# Patient Record
Sex: FEMALE | Race: WHITE | NOT HISPANIC OR LATINO
[De-identification: names, ages, dates, MRNs, and addresses within clinical notes are randomized per-mention and may not be internally consistent; named-entity substitution may affect disease eponyms.]

---

## 2017-09-28 PROBLEM — Z00.00 ENCOUNTER FOR PREVENTIVE HEALTH EXAMINATION: Status: ACTIVE | Noted: 2017-09-28

## 2017-10-05 ENCOUNTER — APPOINTMENT (OUTPATIENT)
Dept: THORACIC SURGERY | Facility: CLINIC | Age: 62
End: 2017-10-05
Payer: COMMERCIAL

## 2017-10-05 VITALS
SYSTOLIC BLOOD PRESSURE: 117 MMHG | RESPIRATION RATE: 16 BRPM | WEIGHT: 136 LBS | BODY MASS INDEX: 21.6 KG/M2 | HEIGHT: 66.5 IN | OXYGEN SATURATION: 97 % | DIASTOLIC BLOOD PRESSURE: 75 MMHG | HEART RATE: 94 BPM

## 2017-10-05 DIAGNOSIS — Z87.898 PERSONAL HISTORY OF OTHER SPECIFIED CONDITIONS: ICD-10-CM

## 2017-10-05 DIAGNOSIS — J45.909 UNSPECIFIED ASTHMA, UNCOMPLICATED: ICD-10-CM

## 2017-10-05 DIAGNOSIS — Z86.79 PERSONAL HISTORY OF OTHER DISEASES OF THE CIRCULATORY SYSTEM: ICD-10-CM

## 2017-10-05 DIAGNOSIS — Z85.828 PERSONAL HISTORY OF OTHER MALIGNANT NEOPLASM OF SKIN: ICD-10-CM

## 2017-10-05 DIAGNOSIS — F17.200 NICOTINE DEPENDENCE, UNSPECIFIED, UNCOMPLICATED: ICD-10-CM

## 2017-10-05 DIAGNOSIS — R91.1 SOLITARY PULMONARY NODULE: ICD-10-CM

## 2017-10-05 PROCEDURE — 99203 OFFICE O/P NEW LOW 30 MIN: CPT

## 2017-10-05 RX ORDER — LEVOTHYROXINE SODIUM 112 UG/1
112 TABLET ORAL
Refills: 0 | Status: ACTIVE | COMMUNITY

## 2017-10-05 RX ORDER — CYCLOBENZAPRINE HCL 10 MG
10 TABLET ORAL
Refills: 0 | Status: ACTIVE | COMMUNITY

## 2017-10-05 RX ORDER — GABAPENTIN 300 MG/1
300 CAPSULE ORAL
Refills: 0 | Status: ACTIVE | COMMUNITY

## 2017-10-05 RX ORDER — PROPRANOLOL HCL 120 MG
120 CAPSULE, EXTENDED RELEASE 24HR ORAL
Refills: 0 | Status: ACTIVE | COMMUNITY

## 2017-10-05 RX ORDER — BUTALBITAL, ACETAMINOPHEN, AND CAFFEINE 50; 300; 40 MG/1; MG/1; MG/1
CAPSULE ORAL
Refills: 0 | Status: ACTIVE | COMMUNITY

## 2017-10-05 RX ORDER — LIOTHYRONINE SODIUM 50 UG/1
TABLET ORAL
Refills: 0 | Status: ACTIVE | COMMUNITY

## 2017-10-07 ENCOUNTER — TRANSCRIPTION ENCOUNTER (OUTPATIENT)
Age: 62
End: 2017-10-07

## 2017-10-18 ENCOUNTER — OUTPATIENT (OUTPATIENT)
Dept: OUTPATIENT SERVICES | Facility: HOSPITAL | Age: 62
LOS: 1 days | Discharge: HOME | End: 2017-10-18
Payer: MEDICAID

## 2017-10-18 VITALS
TEMPERATURE: 98 F | DIASTOLIC BLOOD PRESSURE: 60 MMHG | SYSTOLIC BLOOD PRESSURE: 122 MMHG | RESPIRATION RATE: 15 BRPM | OXYGEN SATURATION: 100 % | HEART RATE: 68 BPM | WEIGHT: 134.92 LBS | HEIGHT: 66.5 IN

## 2017-10-18 DIAGNOSIS — E03.9 HYPOTHYROIDISM, UNSPECIFIED: ICD-10-CM

## 2017-10-18 DIAGNOSIS — Z98.890 OTHER SPECIFIED POSTPROCEDURAL STATES: Chronic | ICD-10-CM

## 2017-10-18 DIAGNOSIS — Z90.89 ACQUIRED ABSENCE OF OTHER ORGANS: Chronic | ICD-10-CM

## 2017-10-18 DIAGNOSIS — J43.9 EMPHYSEMA, UNSPECIFIED: ICD-10-CM

## 2017-10-18 DIAGNOSIS — M79.7 FIBROMYALGIA: ICD-10-CM

## 2017-10-18 DIAGNOSIS — Z85.828 PERSONAL HISTORY OF OTHER MALIGNANT NEOPLASM OF SKIN: Chronic | ICD-10-CM

## 2017-10-18 DIAGNOSIS — R91.1 SOLITARY PULMONARY NODULE: ICD-10-CM

## 2017-10-18 DIAGNOSIS — F17.200 NICOTINE DEPENDENCE, UNSPECIFIED, UNCOMPLICATED: ICD-10-CM

## 2017-10-18 DIAGNOSIS — I34.1 NONRHEUMATIC MITRAL (VALVE) PROLAPSE: ICD-10-CM

## 2017-10-18 DIAGNOSIS — Z01.818 ENCOUNTER FOR OTHER PREPROCEDURAL EXAMINATION: ICD-10-CM

## 2017-10-18 DIAGNOSIS — I10 ESSENTIAL (PRIMARY) HYPERTENSION: ICD-10-CM

## 2017-10-18 LAB
ANION GAP SERPL CALC-SCNC: 5 MMOL/L — SIGNIFICANT CHANGE UP (ref 5–17)
BASOPHILS # BLD AUTO: 0.1 K/UL — SIGNIFICANT CHANGE UP (ref 0–0.2)
BASOPHILS NFR BLD AUTO: 1.2 % — SIGNIFICANT CHANGE UP (ref 0–2)
BUN SERPL-MCNC: 11 MG/DL — SIGNIFICANT CHANGE UP (ref 7–23)
CALCIUM SERPL-MCNC: 9.2 MG/DL — SIGNIFICANT CHANGE UP (ref 8.5–10.1)
CHLORIDE SERPL-SCNC: 101 MMOL/L — SIGNIFICANT CHANGE UP (ref 96–108)
CO2 SERPL-SCNC: 29 MMOL/L — SIGNIFICANT CHANGE UP (ref 22–31)
CREAT SERPL-MCNC: 0.85 MG/DL — SIGNIFICANT CHANGE UP (ref 0.5–1.3)
EOSINOPHIL # BLD AUTO: 0.2 K/UL — SIGNIFICANT CHANGE UP (ref 0–0.5)
EOSINOPHIL NFR BLD AUTO: 3.3 % — SIGNIFICANT CHANGE UP (ref 0–6)
GLUCOSE SERPL-MCNC: 92 MG/DL — SIGNIFICANT CHANGE UP (ref 70–99)
HCT VFR BLD CALC: 40.8 % — SIGNIFICANT CHANGE UP (ref 34.5–45)
HGB BLD-MCNC: 14.3 G/DL — SIGNIFICANT CHANGE UP (ref 11.5–15.5)
LYMPHOCYTES # BLD AUTO: 2.1 K/UL — SIGNIFICANT CHANGE UP (ref 1–3.3)
LYMPHOCYTES # BLD AUTO: 31.5 % — SIGNIFICANT CHANGE UP (ref 13–44)
MCHC RBC-ENTMCNC: 33 PG — SIGNIFICANT CHANGE UP (ref 27–34)
MCHC RBC-ENTMCNC: 35 GM/DL — SIGNIFICANT CHANGE UP (ref 32–36)
MCV RBC AUTO: 94.4 FL — SIGNIFICANT CHANGE UP (ref 80–100)
MONOCYTES # BLD AUTO: 0.4 K/UL — SIGNIFICANT CHANGE UP (ref 0–0.9)
MONOCYTES NFR BLD AUTO: 6.3 % — SIGNIFICANT CHANGE UP (ref 2–14)
NEUTROPHILS # BLD AUTO: 3.9 K/UL — SIGNIFICANT CHANGE UP (ref 1.8–7.4)
NEUTROPHILS NFR BLD AUTO: 57.7 % — SIGNIFICANT CHANGE UP (ref 43–77)
PLATELET # BLD AUTO: 296 K/UL — SIGNIFICANT CHANGE UP (ref 150–400)
POTASSIUM SERPL-MCNC: 4.3 MMOL/L — SIGNIFICANT CHANGE UP (ref 3.5–5.3)
POTASSIUM SERPL-SCNC: 4.3 MMOL/L — SIGNIFICANT CHANGE UP (ref 3.5–5.3)
RBC # BLD: 4.33 M/UL — SIGNIFICANT CHANGE UP (ref 3.8–5.2)
RBC # FLD: 12.4 % — SIGNIFICANT CHANGE UP (ref 10.3–14.5)
SODIUM SERPL-SCNC: 135 MMOL/L — SIGNIFICANT CHANGE UP (ref 135–145)
WBC # BLD: 6.7 K/UL — SIGNIFICANT CHANGE UP (ref 3.8–10.5)
WBC # FLD AUTO: 6.7 K/UL — SIGNIFICANT CHANGE UP (ref 3.8–10.5)

## 2017-10-18 PROCEDURE — 93010 ELECTROCARDIOGRAM REPORT: CPT

## 2017-10-18 NOTE — H&P PST ADULT - FAMILY HISTORY
Father  Still living? No  Family history of cirrhosis of liver, Age at diagnosis: Age Unknown     Mother  Still living? No  Family history of COPD (chronic obstructive pulmonary disease), Age at diagnosis: Age Unknown

## 2017-10-18 NOTE — H&P PST ADULT - HEALTH CARE MAINTENANCE
rec'd flu vaccine @ [facility] Oct 2017  given flu vaccine info sheet dated 8/7/15.

## 2017-10-18 NOTE — H&P PST ADULT - ENDOCRINE COMMENTS
Reports being treated with radioactive iodine for overactive thyroid. Now requires treatment for hypothyroidism. Followed by endocrinologist.

## 2017-10-18 NOTE — H&P PST ADULT - ASSESSMENT
63 yo female scheduled for  flexible bronchoscopy & right lung resection with Dr. Lopez on 10/25/17    1. Labs as per surgeon  2. EKG  3. Medical clearance with PCP Jake & cardiologist Dr Rodriguez  4. discussed EZ sponges & day of procedure instructions  5. Discussed smoking cessation.

## 2017-10-18 NOTE — H&P PST ADULT - PSH
H/O left breast biopsy  2000. 2001  H/O Moh's micrographic surgery for skin cancer  right thigh 2015  S/P colonoscopy    S/P endoscopy    S/P tonsillectomy  1965

## 2017-10-18 NOTE — H&P PST ADULT - PMH
Back pain    Cervical disc disease    Cigarette smoker    Emphysema of lung    Lumbar disc herniation    Lung mass  right side  Migraine    Neck pain    Neck swelling  b/l swelling superior to clavicles  Palpitation    Skin cancer  right thigh  Thyroid disease Back pain    Cervical disc disease    Cigarette smoker    Emphysema of lung    Hearing loss    Lumbar disc herniation    Lung mass  right side  Migraine    Neck pain    Neck swelling  b/l swelling superior to clavicles  Palpitation    Skin cancer  right thigh  Thyroid disease    Tinnitus    Trigeminal neuralgia

## 2017-10-18 NOTE — H&P PST ADULT - HISTORY OF PRESENT ILLNESS
63 yo female presents to PST prior to proposed procedure. Reports being sent for chest CT in september due to "swelling above collar bones". Reports seeing mass in right lung & was sent for PET scan. States "it lit up". Denies chest pain, cough or SOB.   Now for said procedure.

## 2017-10-18 NOTE — H&P PST ADULT - RESPIRATORY AND THORAX COMMENTS
Reports emphysema. Followed by pulmonologist. Denies use of inhalers. reports recent diagnosis of right lung mass

## 2017-10-24 RX ORDER — FENTANYL CITRATE 50 UG/ML
50 INJECTION INTRAVENOUS
Qty: 0 | Refills: 0 | Status: DISCONTINUED | OUTPATIENT
Start: 2017-10-25 | End: 2017-10-25

## 2017-10-24 RX ORDER — SODIUM CHLORIDE 9 MG/ML
1000 INJECTION INTRAMUSCULAR; INTRAVENOUS; SUBCUTANEOUS
Qty: 0 | Refills: 0 | Status: DISCONTINUED | OUTPATIENT
Start: 2017-10-25 | End: 2017-10-25

## 2017-10-25 ENCOUNTER — RESULT REVIEW (OUTPATIENT)
Age: 62
End: 2017-10-25

## 2017-10-25 ENCOUNTER — INPATIENT (INPATIENT)
Facility: HOSPITAL | Age: 62
LOS: 7 days | Discharge: HOME | End: 2017-11-02
Attending: THORACIC SURGERY (CARDIOTHORACIC VASCULAR SURGERY) | Admitting: THORACIC SURGERY (CARDIOTHORACIC VASCULAR SURGERY)
Payer: COMMERCIAL

## 2017-10-25 ENCOUNTER — APPOINTMENT (OUTPATIENT)
Dept: THORACIC SURGERY | Facility: HOSPITAL | Age: 62
End: 2017-10-25
Payer: COMMERCIAL

## 2017-10-25 VITALS
WEIGHT: 134.92 LBS | RESPIRATION RATE: 16 BRPM | HEIGHT: 66.5 IN | TEMPERATURE: 99 F | OXYGEN SATURATION: 99 % | SYSTOLIC BLOOD PRESSURE: 114 MMHG | HEART RATE: 72 BPM | DIASTOLIC BLOOD PRESSURE: 62 MMHG

## 2017-10-25 DIAGNOSIS — Z90.89 ACQUIRED ABSENCE OF OTHER ORGANS: Chronic | ICD-10-CM

## 2017-10-25 DIAGNOSIS — Z98.890 OTHER SPECIFIED POSTPROCEDURAL STATES: Chronic | ICD-10-CM

## 2017-10-25 DIAGNOSIS — Z85.828 PERSONAL HISTORY OF OTHER MALIGNANT NEOPLASM OF SKIN: Chronic | ICD-10-CM

## 2017-10-25 LAB
ABO RH CONFIRMATION: SIGNIFICANT CHANGE UP
ALLERGY+IMMUNOLOGY DIAG STUDY NOTE: SIGNIFICANT CHANGE UP
GRAM STN FLD: SIGNIFICANT CHANGE UP
SPECIMEN SOURCE: SIGNIFICANT CHANGE UP

## 2017-10-25 PROCEDURE — 32666 THORACOSCOPY W/WEDGE RESECT: CPT

## 2017-10-25 PROCEDURE — 88307 TISSUE EXAM BY PATHOLOGIST: CPT | Mod: 26

## 2017-10-25 PROCEDURE — 88331 PATH CONSLTJ SURG 1 BLK 1SPC: CPT | Mod: 26

## 2017-10-25 PROCEDURE — 32674 THORACOSCOPY LYMPH NODE EXC: CPT

## 2017-10-25 PROCEDURE — 31622 DX BRONCHOSCOPE/WASH: CPT

## 2017-10-25 PROCEDURE — S2900 ROBOTIC SURGICAL SYSTEM: CPT | Mod: NC

## 2017-10-25 PROCEDURE — 71010: CPT | Mod: 26

## 2017-10-25 PROCEDURE — 88305 TISSUE EXAM BY PATHOLOGIST: CPT | Mod: 26

## 2017-10-25 PROCEDURE — 32667 THORACOSCOPY W/W RESECT ADDL: CPT

## 2017-10-25 RX ORDER — LIOTHYRONINE SODIUM 25 UG/1
10 TABLET ORAL
Qty: 0 | Refills: 0 | COMMUNITY

## 2017-10-25 RX ORDER — IBUPROFEN 200 MG
2 TABLET ORAL
Qty: 0 | Refills: 0 | COMMUNITY

## 2017-10-25 RX ORDER — PROPRANOLOL HCL 160 MG
1 CAPSULE, EXTENDED RELEASE 24HR ORAL
Qty: 0 | Refills: 0 | COMMUNITY

## 2017-10-25 RX ORDER — LIOTHYRONINE SODIUM 25 UG/1
5 TABLET ORAL
Qty: 0 | Refills: 0 | Status: DISCONTINUED | OUTPATIENT
Start: 2017-10-25 | End: 2017-11-02

## 2017-10-25 RX ORDER — LEVOTHYROXINE SODIUM 125 MCG
112 TABLET ORAL DAILY
Qty: 0 | Refills: 0 | Status: DISCONTINUED | OUTPATIENT
Start: 2017-10-25 | End: 2017-11-02

## 2017-10-25 RX ORDER — GABAPENTIN 400 MG/1
300 CAPSULE ORAL THREE TIMES A DAY
Qty: 0 | Refills: 0 | Status: DISCONTINUED | OUTPATIENT
Start: 2017-10-25 | End: 2017-11-02

## 2017-10-25 RX ORDER — IPRATROPIUM/ALBUTEROL SULFATE 18-103MCG
3 AEROSOL WITH ADAPTER (GRAM) INHALATION EVERY 6 HOURS
Qty: 0 | Refills: 0 | Status: DISCONTINUED | OUTPATIENT
Start: 2017-10-25 | End: 2017-11-02

## 2017-10-25 RX ORDER — ONDANSETRON 8 MG/1
4 TABLET, FILM COATED ORAL ONCE
Qty: 0 | Refills: 0 | Status: DISCONTINUED | OUTPATIENT
Start: 2017-10-25 | End: 2017-10-25

## 2017-10-25 RX ORDER — LEVOTHYROXINE SODIUM 125 MCG
1 TABLET ORAL
Qty: 0 | Refills: 0 | COMMUNITY

## 2017-10-25 RX ORDER — NALOXONE HYDROCHLORIDE 4 MG/.1ML
0.1 SPRAY NASAL
Qty: 0 | Refills: 0 | Status: DISCONTINUED | OUTPATIENT
Start: 2017-10-25 | End: 2017-11-02

## 2017-10-25 RX ORDER — HYDROMORPHONE HYDROCHLORIDE 2 MG/ML
0.5 INJECTION INTRAMUSCULAR; INTRAVENOUS; SUBCUTANEOUS
Qty: 0 | Refills: 0 | Status: DISCONTINUED | OUTPATIENT
Start: 2017-10-25 | End: 2017-10-26

## 2017-10-25 RX ORDER — PROPRANOLOL HCL 160 MG
120 CAPSULE, EXTENDED RELEASE 24HR ORAL DAILY
Qty: 0 | Refills: 0 | Status: DISCONTINUED | OUTPATIENT
Start: 2017-10-25 | End: 2017-10-25

## 2017-10-25 RX ORDER — GABAPENTIN 400 MG/1
1 CAPSULE ORAL
Qty: 0 | Refills: 0 | COMMUNITY

## 2017-10-25 RX ORDER — PROPRANOLOL HCL 160 MG
120 CAPSULE, EXTENDED RELEASE 24HR ORAL DAILY
Qty: 0 | Refills: 0 | Status: DISCONTINUED | OUTPATIENT
Start: 2017-10-25 | End: 2017-11-02

## 2017-10-25 RX ORDER — MULTIVIT-MIN/FERROUS GLUCONATE 9 MG/15 ML
1 LIQUID (ML) ORAL
Qty: 0 | Refills: 0 | COMMUNITY

## 2017-10-25 RX ORDER — HYDROMORPHONE HYDROCHLORIDE 2 MG/ML
30 INJECTION INTRAMUSCULAR; INTRAVENOUS; SUBCUTANEOUS
Qty: 0 | Refills: 0 | Status: DISCONTINUED | OUTPATIENT
Start: 2017-10-25 | End: 2017-10-26

## 2017-10-25 RX ORDER — ONDANSETRON 8 MG/1
4 TABLET, FILM COATED ORAL EVERY 6 HOURS
Qty: 0 | Refills: 0 | Status: DISCONTINUED | OUTPATIENT
Start: 2017-10-25 | End: 2017-11-02

## 2017-10-25 RX ORDER — SODIUM CHLORIDE 9 MG/ML
1000 INJECTION INTRAMUSCULAR; INTRAVENOUS; SUBCUTANEOUS
Qty: 0 | Refills: 0 | Status: DISCONTINUED | OUTPATIENT
Start: 2017-10-25 | End: 2017-10-26

## 2017-10-25 RX ORDER — CYCLOBENZAPRINE HYDROCHLORIDE 10 MG/1
1 TABLET, FILM COATED ORAL
Qty: 0 | Refills: 0 | COMMUNITY

## 2017-10-25 RX ORDER — OXYCODONE HYDROCHLORIDE 5 MG/1
10 TABLET ORAL EVERY 6 HOURS
Qty: 0 | Refills: 0 | Status: DISCONTINUED | OUTPATIENT
Start: 2017-10-25 | End: 2017-10-25

## 2017-10-25 RX ADMIN — ONDANSETRON 4 MILLIGRAM(S): 8 TABLET, FILM COATED ORAL at 14:37

## 2017-10-25 RX ADMIN — Medication 3 MILLILITER(S): at 20:27

## 2017-10-25 RX ADMIN — SODIUM CHLORIDE 75 MILLILITER(S): 9 INJECTION INTRAMUSCULAR; INTRAVENOUS; SUBCUTANEOUS at 14:07

## 2017-10-25 RX ADMIN — ONDANSETRON 4 MILLIGRAM(S): 8 TABLET, FILM COATED ORAL at 22:50

## 2017-10-25 RX ADMIN — SODIUM CHLORIDE 50 MILLILITER(S): 9 INJECTION INTRAMUSCULAR; INTRAVENOUS; SUBCUTANEOUS at 15:27

## 2017-10-25 RX ADMIN — HYDROMORPHONE HYDROCHLORIDE 30 MILLILITER(S): 2 INJECTION INTRAMUSCULAR; INTRAVENOUS; SUBCUTANEOUS at 14:03

## 2017-10-25 RX ADMIN — FENTANYL CITRATE 50 MICROGRAM(S): 50 INJECTION INTRAVENOUS at 14:03

## 2017-10-25 NOTE — CONSULT NOTE ADULT - SUBJECTIVE AND OBJECTIVE BOX
Patient is a 62y old  Female who presents with a chief complaint of " I had my surgery"      HPI: Pt is a 61 y/o female who is a current smoker, COPD, hypothyroidism, HTN, fibromyalgia, lumbar disc disease who was noted to have a 3cm Rt lung nodule.  PET scan was positive, she was optimized and admitted for elective robotic Rt lung wedge resection.  Post-op medicine consult called for comanagement.  Pt seen and examined, case d/w .  Pt is still sedated from anesthesia, c/o Rt chest wall pain.        PAST MEDICAL & SURGICAL HISTORY:  Mitral valve prolapse  Tinnitus  Hearing loss: right ear with tinnitus  Trigeminal neuralgia  Neck swelling: b/l swelling superior to clavicles  Lumbar disc herniation  Emphysema of lung  Cervical disc disease  Back pain  Neck pain  Skin cancer: right thigh  Cigarette smoker  Lung mass: right side  Palpitation  Thyroid disease  Migraine  S/P endoscopy  S/P colonoscopy  S/P tonsillectomy: 1965  H/O left breast biopsy: 2000. 2001  H/O Moh's micrographic surgery for skin cancer: right thigh 2015      Allergies    morphine (Other)  Topamax (Other)    Intolerances        MEDICATIONS  (STANDING):  HYDROmorphone PCA (1 mG/mL) 30 milliLiter(s) PCA Continuous PCA Continuous  sodium chloride 0.9%. 1000 milliLiter(s) (50 mL/Hr) IV Continuous <Continuous>    MEDICATIONS  (PRN):  HYDROmorphone PCA (1 mG/mL) Rescue Clinician Bolus 0.5 milliGRAM(s) IV Push every 15 minutes PRN for Pain Scale GREATER THAN 6  naloxone Injectable 0.1 milliGRAM(s) IV Push every 3 minutes PRN For ANY of the following changes in patient status:  A. RR LESS THAN 10 breaths per minute, B. Oxygen saturation LESS THAN 90%, C. Sedation score of 6  ondansetron Injectable 4 milliGRAM(s) IV Push every 6 hours PRN Nausea      FAMILY HISTORY:  Family history of COPD (chronic obstructive pulmonary disease) (Mother)  Family history of cirrhosis of liver (Father)      Social History  , lives with , current smoker (smokes 1 ppd for 45 years), no h/o substance abuse    Vital Signs Last 24 Hrs  T(C): 36.2 (25 Oct 2017 17:43), Max: 37.1 (25 Oct 2017 08:44)  T(F): 97.2 (25 Oct 2017 17:43), Max: 98.7 (25 Oct 2017 08:44)  HR: 82 (25 Oct 2017 18:15) (50 - 82)  BP: 115/58 (25 Oct 2017 18:15) (106/53 - 141/75)  BP(mean): 72 (25 Oct 2017 18:15) (65 - 72)  RR: 15 (25 Oct 2017 18:15) (11 - 16)  SpO2: 98% (25 Oct 2017 18:15) (97% - 100%)    Daily Height in cm: 168.91 (25 Oct 2017 08:44)    Daily     I&O's Summary    25 Oct 2017 07:01  -  25 Oct 2017 18:56  --------------------------------------------------------  IN: 1250 mL / OUT: 760 mL / NET: 490 mL          Data

## 2017-10-25 NOTE — PATIENT PROFILE ADULT. - PMH
Back pain    Cervical disc disease    Cigarette smoker    Emphysema of lung    Hearing loss  right ear with tinnitus  Lumbar disc herniation    Lung mass  right side  Migraine    Neck pain    Neck swelling  b/l swelling superior to clavicles  Palpitation    Skin cancer  right thigh  Thyroid disease    Tinnitus    Trigeminal neuralgia Back pain    Cervical disc disease    Cigarette smoker    Emphysema of lung    Hearing loss  right ear with tinnitus  Lumbar disc herniation    Lung mass  right side  Migraine    Mitral valve prolapse    Neck pain    Neck swelling  b/l swelling superior to clavicles  Palpitation    Skin cancer  right thigh  Thyroid disease    Tinnitus    Trigeminal neuralgia

## 2017-10-25 NOTE — BRIEF OPERATIVE NOTE - PROCEDURE
<<-----Click on this checkbox to enter Procedure Wedge resection of right lung  10/25/2017  Robotically assisted right middle and right lower lobe wedge resection  Active  [email or web address]

## 2017-10-25 NOTE — CONSULT NOTE ADULT - CONSULT REASON
CTSP by Dr Lopez for medical comanagement.      Wedge resection of right lung  10/25/2017  Robotically assisted right middle and right lower lobe wedge resection  Active  [email or web address].

## 2017-10-26 DIAGNOSIS — R91.8 OTHER NONSPECIFIC ABNORMAL FINDING OF LUNG FIELD: ICD-10-CM

## 2017-10-26 LAB
ANION GAP SERPL CALC-SCNC: 6 MMOL/L — SIGNIFICANT CHANGE UP (ref 5–17)
BASOPHILS # BLD AUTO: 0 K/UL — SIGNIFICANT CHANGE UP (ref 0–0.2)
BASOPHILS NFR BLD AUTO: 0.1 % — SIGNIFICANT CHANGE UP (ref 0–2)
BUN SERPL-MCNC: 10 MG/DL — SIGNIFICANT CHANGE UP (ref 7–23)
CALCIUM SERPL-MCNC: 8.8 MG/DL — SIGNIFICANT CHANGE UP (ref 8.5–10.1)
CHLORIDE SERPL-SCNC: 104 MMOL/L — SIGNIFICANT CHANGE UP (ref 96–108)
CO2 SERPL-SCNC: 28 MMOL/L — SIGNIFICANT CHANGE UP (ref 22–31)
CREAT SERPL-MCNC: 0.67 MG/DL — SIGNIFICANT CHANGE UP (ref 0.5–1.3)
EOSINOPHIL # BLD AUTO: 0 K/UL — SIGNIFICANT CHANGE UP (ref 0–0.5)
EOSINOPHIL NFR BLD AUTO: 0 % — SIGNIFICANT CHANGE UP (ref 0–6)
GLUCOSE SERPL-MCNC: 132 MG/DL — HIGH (ref 70–99)
HCT VFR BLD CALC: 36.8 % — SIGNIFICANT CHANGE UP (ref 34.5–45)
HGB BLD-MCNC: 13 G/DL — SIGNIFICANT CHANGE UP (ref 11.5–15.5)
LYMPHOCYTES # BLD AUTO: 0.8 K/UL — LOW (ref 1–3.3)
LYMPHOCYTES # BLD AUTO: 5.7 % — LOW (ref 13–44)
MCHC RBC-ENTMCNC: 33.3 PG — SIGNIFICANT CHANGE UP (ref 27–34)
MCHC RBC-ENTMCNC: 35.3 GM/DL — SIGNIFICANT CHANGE UP (ref 32–36)
MCV RBC AUTO: 94.2 FL — SIGNIFICANT CHANGE UP (ref 80–100)
MONOCYTES # BLD AUTO: 0.9 K/UL — SIGNIFICANT CHANGE UP (ref 0–0.9)
MONOCYTES NFR BLD AUTO: 5.8 % — SIGNIFICANT CHANGE UP (ref 2–14)
NEUTROPHILS # BLD AUTO: 13.3 K/UL — HIGH (ref 1.8–7.4)
NEUTROPHILS NFR BLD AUTO: 88.4 % — HIGH (ref 43–77)
NIGHT BLUE STAIN TISS: SIGNIFICANT CHANGE UP
PLATELET # BLD AUTO: 255 K/UL — SIGNIFICANT CHANGE UP (ref 150–400)
POTASSIUM SERPL-MCNC: 4.8 MMOL/L — SIGNIFICANT CHANGE UP (ref 3.5–5.3)
POTASSIUM SERPL-SCNC: 4.8 MMOL/L — SIGNIFICANT CHANGE UP (ref 3.5–5.3)
RBC # BLD: 3.91 M/UL — SIGNIFICANT CHANGE UP (ref 3.8–5.2)
RBC # FLD: 12.5 % — SIGNIFICANT CHANGE UP (ref 10.3–14.5)
SODIUM SERPL-SCNC: 138 MMOL/L — SIGNIFICANT CHANGE UP (ref 135–145)
SPECIMEN SOURCE: SIGNIFICANT CHANGE UP
WBC # BLD: 15 K/UL — HIGH (ref 3.8–10.5)
WBC # FLD AUTO: 15 K/UL — HIGH (ref 3.8–10.5)

## 2017-10-26 PROCEDURE — 71010: CPT | Mod: 26,77

## 2017-10-26 PROCEDURE — 71010: CPT | Mod: 26

## 2017-10-26 RX ORDER — OXYCODONE HYDROCHLORIDE 5 MG/1
10 TABLET ORAL EVERY 6 HOURS
Qty: 0 | Refills: 0 | Status: DISCONTINUED | OUTPATIENT
Start: 2017-10-26 | End: 2017-11-01

## 2017-10-26 RX ORDER — OXYCODONE HYDROCHLORIDE 5 MG/1
5 TABLET ORAL EVERY 4 HOURS
Qty: 0 | Refills: 0 | Status: DISCONTINUED | OUTPATIENT
Start: 2017-10-26 | End: 2017-11-02

## 2017-10-26 RX ORDER — HEPARIN SODIUM 5000 [USP'U]/ML
5000 INJECTION INTRAVENOUS; SUBCUTANEOUS EVERY 8 HOURS
Qty: 0 | Refills: 0 | Status: DISCONTINUED | OUTPATIENT
Start: 2017-10-26 | End: 2017-11-02

## 2017-10-26 RX ORDER — IBUPROFEN 200 MG
400 TABLET ORAL EVERY 6 HOURS
Qty: 0 | Refills: 0 | Status: DISCONTINUED | OUTPATIENT
Start: 2017-10-26 | End: 2017-10-28

## 2017-10-26 RX ORDER — PROCHLORPERAZINE MALEATE 5 MG
10 TABLET ORAL EVERY 6 HOURS
Qty: 0 | Refills: 0 | Status: DISCONTINUED | OUTPATIENT
Start: 2017-10-26 | End: 2017-11-02

## 2017-10-26 RX ORDER — SUMATRIPTAN SUCCINATE 4 MG/.5ML
25 INJECTION, SOLUTION SUBCUTANEOUS DAILY
Qty: 0 | Refills: 0 | Status: DISCONTINUED | OUTPATIENT
Start: 2017-10-26 | End: 2017-10-26

## 2017-10-26 RX ORDER — SUMATRIPTAN SUCCINATE 4 MG/.5ML
25 INJECTION, SOLUTION SUBCUTANEOUS EVERY 4 HOURS
Qty: 0 | Refills: 0 | Status: DISCONTINUED | OUTPATIENT
Start: 2017-10-26 | End: 2017-10-26

## 2017-10-26 RX ORDER — IBUPROFEN 200 MG
400 TABLET ORAL ONCE
Qty: 0 | Refills: 0 | Status: COMPLETED | OUTPATIENT
Start: 2017-10-26 | End: 2017-10-26

## 2017-10-26 RX ORDER — ACETAMINOPHEN 500 MG
650 TABLET ORAL EVERY 6 HOURS
Qty: 0 | Refills: 0 | Status: DISCONTINUED | OUTPATIENT
Start: 2017-10-26 | End: 2017-11-02

## 2017-10-26 RX ADMIN — Medication 10 MILLIGRAM(S): at 09:24

## 2017-10-26 RX ADMIN — Medication 112 MICROGRAM(S): at 05:36

## 2017-10-26 RX ADMIN — Medication 400 MILLIGRAM(S): at 23:00

## 2017-10-26 RX ADMIN — GABAPENTIN 300 MILLIGRAM(S): 400 CAPSULE ORAL at 05:36

## 2017-10-26 RX ADMIN — Medication 10 MILLIGRAM(S): at 02:32

## 2017-10-26 RX ADMIN — Medication 400 MILLIGRAM(S): at 10:04

## 2017-10-26 RX ADMIN — Medication 120 MILLIGRAM(S): at 05:36

## 2017-10-26 RX ADMIN — Medication 400 MILLIGRAM(S): at 23:40

## 2017-10-26 RX ADMIN — HEPARIN SODIUM 5000 UNIT(S): 5000 INJECTION INTRAVENOUS; SUBCUTANEOUS at 22:55

## 2017-10-26 RX ADMIN — HEPARIN SODIUM 5000 UNIT(S): 5000 INJECTION INTRAVENOUS; SUBCUTANEOUS at 15:59

## 2017-10-26 RX ADMIN — Medication 400 MILLIGRAM(S): at 16:52

## 2017-10-26 RX ADMIN — GABAPENTIN 300 MILLIGRAM(S): 400 CAPSULE ORAL at 22:55

## 2017-10-26 RX ADMIN — Medication 400 MILLIGRAM(S): at 15:59

## 2017-10-26 RX ADMIN — GABAPENTIN 300 MILLIGRAM(S): 400 CAPSULE ORAL at 15:59

## 2017-10-26 RX ADMIN — Medication 400 MILLIGRAM(S): at 11:00

## 2017-10-26 RX ADMIN — LIOTHYRONINE SODIUM 5 MICROGRAM(S): 25 TABLET ORAL at 12:07

## 2017-10-26 NOTE — CONSULT NOTE ADULT - ASSESSMENT
PROGRESS:    Rt Lung Mass-s/p vats with wedge resection- CHEST TUBES  COPD  HTN  Hypothyroidism    PLAN:    ct surgery fu  pain control  incentive spirometry  aerosols  dvt prophylasix
Pt is a 61 y/o female who is a current smoker, COPD, hypothyroidism, HTN, fibromyalgia, lumbar disc disease who was noted to have a 3cm Rt lung nodule.  PET scan was positive, she was optimized and admitted for elective robotic Rt lung wedge resection.  Post-op medicine consult called for comanagement.      * Rt Lung Mass-s/p vats with wedge resection, continue CT management, encourage use of incentive spirometry, DVT proph, monitor post-op labs.  * Hypothyroidism-continue synthroid, cytomel  * HTN-inderal  * COPD-stable, nebulizer  * Tobacco Use-smoking cessation  * Proph- heparin  * Disp-OOB, PT when able  * Comm- d/w pt,  in details, ANASTACIA

## 2017-10-26 NOTE — CONSULT NOTE ADULT - SUBJECTIVE AND OBJECTIVE BOX
HPI:    pat seen & examine & full consult dictated.    PAST MEDICAL & SURGICAL HISTORY:  Mitral valve prolapse  Tinnitus  Hearing loss: right ear with tinnitus  Trigeminal neuralgia  Neck swelling: b/l swelling superior to clavicles  Lumbar disc herniation  Emphysema of lung  Cervical disc disease  Back pain  Neck pain  Skin cancer: right thigh  Cigarette smoker  Lung mass: right side  Palpitation  Thyroid disease  Migraine  S/P endoscopy  S/P colonoscopy  S/P tonsillectomy: 1965  H/O left breast biopsy: 2000. 2001  H/O Moh's micrographic surgery for skin cancer: right thigh 2015      Home Medications:  Advil 200 mg oral tablet: 2 tab(s) orally every 6 hours (25 Oct 2017 09:05)  Cytomel: 10 microgram(s) orally 2 times a week (25 Oct 2017 09:05)  Daily Multi oral tablet: 1 tab(s) orally once a day (25 Oct 2017 09:05)  Flexeril 10 mg oral tablet: 1 tab(s) orally 3 times a day, As Needed (25 Oct 2017 09:05)  Inderal  mg oral capsule, extended release: 1 cap(s) orally once a day (at bedtime) (25 Oct 2017 09:05)  levothyroxine 112 mcg (0.112 mg) oral tablet: 1 tab(s) orally once a day (25 Oct 2017 09:05)  Neurontin 300 mg oral capsule: 1 cap(s) orally 3 times a day (25 Oct 2017 09:05)      MEDICATIONS  (STANDING):  ALBUTerol/ipratropium for Nebulization 3 milliLiter(s) Nebulizer every 6 hours  gabapentin 300 milliGRAM(s) Oral three times a day  heparin  Injectable 5000 Unit(s) SubCutaneous every 8 hours  ibuprofen  Tablet 400 milliGRAM(s) Oral every 6 hours  levothyroxine 112 MICROGram(s) Oral daily  liothyronine 5 MICROGram(s) Oral every 3 days  propranolol  milliGRAM(s) Oral daily  sodium chloride 0.9%. 1000 milliLiter(s) (50 mL/Hr) IV Continuous <Continuous>    MEDICATIONS  (PRN):  acetaminophen   Tablet. 650 milliGRAM(s) Oral every 6 hours PRN Mild Pain (1 - 3)  naloxone Injectable 0.1 milliGRAM(s) IV Push every 3 minutes PRN For ANY of the following changes in patient status:  A. RR LESS THAN 10 breaths per minute, B. Oxygen saturation LESS THAN 90%, C. Sedation score of 6  ondansetron Injectable 4 milliGRAM(s) IV Push every 6 hours PRN Nausea  oxyCODONE    IR 5 milliGRAM(s) Oral every 4 hours PRN Moderate Pain (4 - 6)  oxyCODONE    IR 10 milliGRAM(s) Oral every 6 hours PRN Severe Pain (7 - 10)  prochlorperazine   Injectable 10 milliGRAM(s) IV Push every 6 hours PRN nausea  SUMAtriptan 25 milliGRAM(s) Oral daily PRN Migraine. may repeat once after 2hrs if needed.      Allergies    morphine (Other)  Topamax (Other)    Intolerances        SOCIAL HISTORY: Denies tobacco, etoh abuse or illicit drug use    FAMILY HISTORY:  Family history of COPD (chronic obstructive pulmonary disease) (Mother)  Family history of cirrhosis of liver (Father)      Vital Signs Last 24 Hrs  T(C): 36.2 (26 Oct 2017 05:14), Max: 36.4 (25 Oct 2017 13:30)  T(F): 97.1 (26 Oct 2017 05:14), Max: 97.5 (25 Oct 2017 13:30)  HR: 83 (26 Oct 2017 09:00) (50 - 93)  BP: 114/64 (26 Oct 2017 09:00) (105/59 - 141/75)  BP(mean): 74 (26 Oct 2017 09:00) (65 - 109)  RR: 16 (26 Oct 2017 09:00) (7 - 27)  SpO2: 97% (26 Oct 2017 09:00) (97% - 100%)        REVIEW OF SYSTEMS:    CONSTITUTIONAL:  As per HPI.  HEENT:  Eyes:  No diplopia or blurred vision. ENT:  No earache, sore throat or runny nose.  CARDIOVASCULAR:  No pressure, squeezing, tightness, heaviness or aching about the chest, neck, axilla or epigastrium.  RESPIRATORY:  No cough, shortness of breath, PND or orthopnea.  GASTROINTESTINAL:  No nausea, vomiting or diarrhea.  GENITOURINARY:  No dysuria, frequency or urgency.  MUSCULOSKELETAL:  As per HPI.  SKIN:  No change in skin, hair or nails.  NEUROLOGIC:  No paresthesias, fasciculations, seizures or weakness.  PSYCHIATRIC:  No disorder of thought or mood.  ENDOCRINE:  No heat or cold intolerance, polyuria or polydipsia.  HEMATOLOGICAL:  No easy bruising or bleedings:  .     PHYSICAL EXAMINATION:    GENERAL APPEARANCE:  Pt. is not currently dyspneic, in no distress. Pt. is alert, oriented, and pleasant.  HEENT:  Pupils are normal and react normally. No icterus. Mucous membranes well colored.  NECK:  Supple. No lymphadenopathy. Jugular venous pressure not elevated. Carotids equal.   HEART:   The cardiac impulse has a normal quality. Regular. Normal S1 and S2. There are no murmurs, rubs or gallops noted  CHEST:  Chest few rhonchi to auscultation. Normal respiratory effort.  ABDOMEN:  Soft and nontender.   EXTREMITIES:  There is no cyanosis, clubbing or edema.   SKIN:  No rash or significant lesions are noted.    LABS:                        13.0   15.0  )-----------( 255      ( 26 Oct 2017 05:43 )             36.8     10-26    138  |  104  |  10  ----------------------------<  132<H>  4.8   |  28  |  0.67    Ca    8.8      26 Oct 2017 05:43      Culture - Tissue with Gram Stain (collected 10-25-17 @ 13:46)  Source: .Tissue Right Middle Lobe Tissue for Culture  Gram Stain (10-25-17 @ 23:59):    Rare polymorphonuclear leukocytes per low power field    No organisms seen per oil power field        RADIOLOGY & ADDITIONAL STUDIES:     Chest 1 View AP/PA. (10.25.17 @ 14:12) >  IMPRESSION:      Right chest tube has been placed. No pneumothorax. Extensive subcutaneous   air within the right neck and right chest wall.    The lungs are clear.  No pleural abnormality is seen.      Cardiomediastinal silhouette is intact.

## 2017-10-26 NOTE — PHYSICAL THERAPY INITIAL EVALUATION ADULT - ADDITIONAL COMMENTS
Pt. resides in a one level house w/ family w/ no steps to enter. Pt. ambulates independently w/out an AD at baseline.

## 2017-10-27 LAB
HCT VFR BLD CALC: 33.9 % — LOW (ref 34.5–45)
HGB BLD-MCNC: 11.7 G/DL — SIGNIFICANT CHANGE UP (ref 11.5–15.5)
MCHC RBC-ENTMCNC: 32.9 PG — SIGNIFICANT CHANGE UP (ref 27–34)
MCHC RBC-ENTMCNC: 34.4 GM/DL — SIGNIFICANT CHANGE UP (ref 32–36)
MCV RBC AUTO: 95.4 FL — SIGNIFICANT CHANGE UP (ref 80–100)
PLATELET # BLD AUTO: 219 K/UL — SIGNIFICANT CHANGE UP (ref 150–400)
RBC # BLD: 3.55 M/UL — LOW (ref 3.8–5.2)
RBC # FLD: 12.8 % — SIGNIFICANT CHANGE UP (ref 10.3–14.5)
WBC # BLD: 11.2 K/UL — HIGH (ref 3.8–10.5)
WBC # FLD AUTO: 11.2 K/UL — HIGH (ref 3.8–10.5)

## 2017-10-27 PROCEDURE — 71010: CPT | Mod: 26,77

## 2017-10-27 PROCEDURE — 71010: CPT | Mod: 26

## 2017-10-27 RX ORDER — DOCUSATE SODIUM 100 MG
100 CAPSULE ORAL THREE TIMES A DAY
Qty: 0 | Refills: 0 | Status: DISCONTINUED | OUTPATIENT
Start: 2017-10-27 | End: 2017-11-02

## 2017-10-27 RX ORDER — SENNA PLUS 8.6 MG/1
2 TABLET ORAL AT BEDTIME
Qty: 0 | Refills: 0 | Status: DISCONTINUED | OUTPATIENT
Start: 2017-10-27 | End: 2017-11-02

## 2017-10-27 RX ADMIN — GABAPENTIN 300 MILLIGRAM(S): 400 CAPSULE ORAL at 15:28

## 2017-10-27 RX ADMIN — Medication 400 MILLIGRAM(S): at 19:57

## 2017-10-27 RX ADMIN — GABAPENTIN 300 MILLIGRAM(S): 400 CAPSULE ORAL at 05:15

## 2017-10-27 RX ADMIN — OXYCODONE HYDROCHLORIDE 5 MILLIGRAM(S): 5 TABLET ORAL at 19:58

## 2017-10-27 RX ADMIN — Medication 400 MILLIGRAM(S): at 12:15

## 2017-10-27 RX ADMIN — OXYCODONE HYDROCHLORIDE 5 MILLIGRAM(S): 5 TABLET ORAL at 21:22

## 2017-10-27 RX ADMIN — HEPARIN SODIUM 5000 UNIT(S): 5000 INJECTION INTRAVENOUS; SUBCUTANEOUS at 21:28

## 2017-10-27 RX ADMIN — GABAPENTIN 300 MILLIGRAM(S): 400 CAPSULE ORAL at 21:28

## 2017-10-27 RX ADMIN — Medication 400 MILLIGRAM(S): at 05:15

## 2017-10-27 RX ADMIN — Medication 400 MILLIGRAM(S): at 21:28

## 2017-10-27 RX ADMIN — Medication 112 MICROGRAM(S): at 05:15

## 2017-10-27 RX ADMIN — Medication 3 MILLILITER(S): at 20:57

## 2017-10-27 RX ADMIN — Medication 400 MILLIGRAM(S): at 06:13

## 2017-10-27 RX ADMIN — Medication 650 MILLIGRAM(S): at 09:30

## 2017-10-27 RX ADMIN — Medication 100 MILLIGRAM(S): at 21:28

## 2017-10-27 RX ADMIN — Medication 650 MILLIGRAM(S): at 08:55

## 2017-10-27 RX ADMIN — SENNA PLUS 2 TABLET(S): 8.6 TABLET ORAL at 21:28

## 2017-10-27 RX ADMIN — HEPARIN SODIUM 5000 UNIT(S): 5000 INJECTION INTRAVENOUS; SUBCUTANEOUS at 05:16

## 2017-10-27 RX ADMIN — Medication 400 MILLIGRAM(S): at 19:59

## 2017-10-27 RX ADMIN — Medication 120 MILLIGRAM(S): at 05:15

## 2017-10-27 RX ADMIN — Medication 3 MILLILITER(S): at 15:55

## 2017-10-27 RX ADMIN — Medication 100 MILLIGRAM(S): at 15:29

## 2017-10-27 RX ADMIN — HEPARIN SODIUM 5000 UNIT(S): 5000 INJECTION INTRAVENOUS; SUBCUTANEOUS at 15:29

## 2017-10-28 LAB
ANION GAP SERPL CALC-SCNC: 7 MMOL/L — SIGNIFICANT CHANGE UP (ref 5–17)
BUN SERPL-MCNC: 7 MG/DL — SIGNIFICANT CHANGE UP (ref 7–23)
CALCIUM SERPL-MCNC: 8.8 MG/DL — SIGNIFICANT CHANGE UP (ref 8.5–10.1)
CHLORIDE SERPL-SCNC: 102 MMOL/L — SIGNIFICANT CHANGE UP (ref 96–108)
CO2 SERPL-SCNC: 26 MMOL/L — SIGNIFICANT CHANGE UP (ref 22–31)
CREAT SERPL-MCNC: 0.58 MG/DL — SIGNIFICANT CHANGE UP (ref 0.5–1.3)
GLUCOSE SERPL-MCNC: 96 MG/DL — SIGNIFICANT CHANGE UP (ref 70–99)
HCT VFR BLD CALC: 35 % — SIGNIFICANT CHANGE UP (ref 34.5–45)
HGB BLD-MCNC: 12.4 G/DL — SIGNIFICANT CHANGE UP (ref 11.5–15.5)
MCHC RBC-ENTMCNC: 33.2 PG — SIGNIFICANT CHANGE UP (ref 27–34)
MCHC RBC-ENTMCNC: 35.3 GM/DL — SIGNIFICANT CHANGE UP (ref 32–36)
MCV RBC AUTO: 94 FL — SIGNIFICANT CHANGE UP (ref 80–100)
PLATELET # BLD AUTO: 254 K/UL — SIGNIFICANT CHANGE UP (ref 150–400)
POTASSIUM SERPL-MCNC: 3.5 MMOL/L — SIGNIFICANT CHANGE UP (ref 3.5–5.3)
POTASSIUM SERPL-SCNC: 3.5 MMOL/L — SIGNIFICANT CHANGE UP (ref 3.5–5.3)
RBC # BLD: 3.72 M/UL — LOW (ref 3.8–5.2)
RBC # FLD: 12.4 % — SIGNIFICANT CHANGE UP (ref 10.3–14.5)
SODIUM SERPL-SCNC: 135 MMOL/L — SIGNIFICANT CHANGE UP (ref 135–145)
WBC # BLD: 8.2 K/UL — SIGNIFICANT CHANGE UP (ref 3.8–10.5)
WBC # FLD AUTO: 8.2 K/UL — SIGNIFICANT CHANGE UP (ref 3.8–10.5)

## 2017-10-28 PROCEDURE — 71010: CPT | Mod: 26

## 2017-10-28 RX ORDER — KETOROLAC TROMETHAMINE 30 MG/ML
15 SYRINGE (ML) INJECTION EVERY 6 HOURS
Qty: 0 | Refills: 0 | Status: DISCONTINUED | OUTPATIENT
Start: 2017-10-28 | End: 2017-10-29

## 2017-10-28 RX ADMIN — Medication 120 MILLIGRAM(S): at 05:15

## 2017-10-28 RX ADMIN — HEPARIN SODIUM 5000 UNIT(S): 5000 INJECTION INTRAVENOUS; SUBCUTANEOUS at 05:14

## 2017-10-28 RX ADMIN — Medication 3 MILLILITER(S): at 08:44

## 2017-10-28 RX ADMIN — GABAPENTIN 300 MILLIGRAM(S): 400 CAPSULE ORAL at 15:22

## 2017-10-28 RX ADMIN — GABAPENTIN 300 MILLIGRAM(S): 400 CAPSULE ORAL at 05:15

## 2017-10-28 RX ADMIN — HEPARIN SODIUM 5000 UNIT(S): 5000 INJECTION INTRAVENOUS; SUBCUTANEOUS at 13:00

## 2017-10-28 RX ADMIN — GABAPENTIN 300 MILLIGRAM(S): 400 CAPSULE ORAL at 21:29

## 2017-10-28 RX ADMIN — OXYCODONE HYDROCHLORIDE 5 MILLIGRAM(S): 5 TABLET ORAL at 22:00

## 2017-10-28 RX ADMIN — Medication 650 MILLIGRAM(S): at 15:23

## 2017-10-28 RX ADMIN — Medication 112 MICROGRAM(S): at 05:15

## 2017-10-28 RX ADMIN — Medication 15 MILLIGRAM(S): at 21:28

## 2017-10-28 RX ADMIN — Medication 15 MILLIGRAM(S): at 13:00

## 2017-10-28 RX ADMIN — Medication 400 MILLIGRAM(S): at 05:15

## 2017-10-28 RX ADMIN — Medication 400 MILLIGRAM(S): at 09:41

## 2017-10-28 RX ADMIN — Medication 100 MILLIGRAM(S): at 15:23

## 2017-10-28 RX ADMIN — SENNA PLUS 2 TABLET(S): 8.6 TABLET ORAL at 21:29

## 2017-10-28 RX ADMIN — HEPARIN SODIUM 5000 UNIT(S): 5000 INJECTION INTRAVENOUS; SUBCUTANEOUS at 21:29

## 2017-10-28 RX ADMIN — Medication 650 MILLIGRAM(S): at 17:01

## 2017-10-28 RX ADMIN — Medication 100 MILLIGRAM(S): at 05:16

## 2017-10-28 RX ADMIN — Medication 15 MILLIGRAM(S): at 11:49

## 2017-10-28 RX ADMIN — Medication 15 MILLIGRAM(S): at 18:29

## 2017-10-28 RX ADMIN — Medication 100 MILLIGRAM(S): at 21:29

## 2017-10-29 PROCEDURE — 71010: CPT | Mod: 26

## 2017-10-29 RX ADMIN — Medication 15 MILLIGRAM(S): at 18:21

## 2017-10-29 RX ADMIN — Medication 15 MILLIGRAM(S): at 05:41

## 2017-10-29 RX ADMIN — GABAPENTIN 300 MILLIGRAM(S): 400 CAPSULE ORAL at 15:20

## 2017-10-29 RX ADMIN — HEPARIN SODIUM 5000 UNIT(S): 5000 INJECTION INTRAVENOUS; SUBCUTANEOUS at 21:30

## 2017-10-29 RX ADMIN — GABAPENTIN 300 MILLIGRAM(S): 400 CAPSULE ORAL at 05:40

## 2017-10-29 RX ADMIN — Medication 15 MILLIGRAM(S): at 19:18

## 2017-10-29 RX ADMIN — HEPARIN SODIUM 5000 UNIT(S): 5000 INJECTION INTRAVENOUS; SUBCUTANEOUS at 15:21

## 2017-10-29 RX ADMIN — Medication 15 MILLIGRAM(S): at 15:32

## 2017-10-29 RX ADMIN — OXYCODONE HYDROCHLORIDE 5 MILLIGRAM(S): 5 TABLET ORAL at 05:42

## 2017-10-29 RX ADMIN — HEPARIN SODIUM 5000 UNIT(S): 5000 INJECTION INTRAVENOUS; SUBCUTANEOUS at 05:40

## 2017-10-29 RX ADMIN — LIOTHYRONINE SODIUM 5 MICROGRAM(S): 25 TABLET ORAL at 11:39

## 2017-10-29 RX ADMIN — OXYCODONE HYDROCHLORIDE 10 MILLIGRAM(S): 5 TABLET ORAL at 22:05

## 2017-10-29 RX ADMIN — Medication 1 TABLET(S): at 11:29

## 2017-10-29 RX ADMIN — Medication 15 MILLIGRAM(S): at 05:40

## 2017-10-29 RX ADMIN — Medication 112 MICROGRAM(S): at 05:42

## 2017-10-29 RX ADMIN — Medication 15 MILLIGRAM(S): at 11:38

## 2017-10-29 RX ADMIN — Medication 3 MILLILITER(S): at 20:18

## 2017-10-29 RX ADMIN — Medication 650 MILLIGRAM(S): at 15:33

## 2017-10-29 RX ADMIN — OXYCODONE HYDROCHLORIDE 10 MILLIGRAM(S): 5 TABLET ORAL at 21:31

## 2017-10-29 RX ADMIN — Medication 1 TABLET(S): at 10:42

## 2017-10-29 RX ADMIN — GABAPENTIN 300 MILLIGRAM(S): 400 CAPSULE ORAL at 21:30

## 2017-10-29 RX ADMIN — Medication 650 MILLIGRAM(S): at 09:22

## 2017-10-29 RX ADMIN — Medication 120 MILLIGRAM(S): at 05:41

## 2017-10-30 LAB
CULTURE RESULTS: SIGNIFICANT CHANGE UP
SPECIMEN SOURCE: SIGNIFICANT CHANGE UP
SURGICAL PATHOLOGY FINAL REPORT - CH: SIGNIFICANT CHANGE UP

## 2017-10-30 PROCEDURE — 71010: CPT | Mod: 26

## 2017-10-30 RX ADMIN — GABAPENTIN 300 MILLIGRAM(S): 400 CAPSULE ORAL at 21:40

## 2017-10-30 RX ADMIN — Medication 1 TABLET(S): at 07:53

## 2017-10-30 RX ADMIN — HEPARIN SODIUM 5000 UNIT(S): 5000 INJECTION INTRAVENOUS; SUBCUTANEOUS at 21:40

## 2017-10-30 RX ADMIN — Medication 112 MICROGRAM(S): at 05:22

## 2017-10-30 RX ADMIN — Medication 1 TABLET(S): at 08:30

## 2017-10-30 RX ADMIN — HEPARIN SODIUM 5000 UNIT(S): 5000 INJECTION INTRAVENOUS; SUBCUTANEOUS at 13:37

## 2017-10-30 RX ADMIN — OXYCODONE HYDROCHLORIDE 10 MILLIGRAM(S): 5 TABLET ORAL at 21:40

## 2017-10-30 RX ADMIN — GABAPENTIN 300 MILLIGRAM(S): 400 CAPSULE ORAL at 05:22

## 2017-10-30 RX ADMIN — OXYCODONE HYDROCHLORIDE 5 MILLIGRAM(S): 5 TABLET ORAL at 13:33

## 2017-10-30 RX ADMIN — Medication 100 MILLIGRAM(S): at 13:37

## 2017-10-30 RX ADMIN — OXYCODONE HYDROCHLORIDE 10 MILLIGRAM(S): 5 TABLET ORAL at 06:00

## 2017-10-30 RX ADMIN — OXYCODONE HYDROCHLORIDE 10 MILLIGRAM(S): 5 TABLET ORAL at 22:10

## 2017-10-30 RX ADMIN — Medication 120 MILLIGRAM(S): at 05:22

## 2017-10-30 RX ADMIN — HEPARIN SODIUM 5000 UNIT(S): 5000 INJECTION INTRAVENOUS; SUBCUTANEOUS at 05:22

## 2017-10-30 RX ADMIN — Medication 3 MILLILITER(S): at 20:27

## 2017-10-30 RX ADMIN — OXYCODONE HYDROCHLORIDE 10 MILLIGRAM(S): 5 TABLET ORAL at 05:23

## 2017-10-30 RX ADMIN — GABAPENTIN 300 MILLIGRAM(S): 400 CAPSULE ORAL at 13:37

## 2017-10-30 RX ADMIN — OXYCODONE HYDROCHLORIDE 5 MILLIGRAM(S): 5 TABLET ORAL at 14:30

## 2017-10-31 PROCEDURE — 71010: CPT | Mod: 26

## 2017-10-31 RX ADMIN — HEPARIN SODIUM 5000 UNIT(S): 5000 INJECTION INTRAVENOUS; SUBCUTANEOUS at 05:53

## 2017-10-31 RX ADMIN — OXYCODONE HYDROCHLORIDE 5 MILLIGRAM(S): 5 TABLET ORAL at 19:00

## 2017-10-31 RX ADMIN — Medication 1 TABLET(S): at 21:00

## 2017-10-31 RX ADMIN — HEPARIN SODIUM 5000 UNIT(S): 5000 INJECTION INTRAVENOUS; SUBCUTANEOUS at 13:07

## 2017-10-31 RX ADMIN — Medication 1 TABLET(S): at 11:25

## 2017-10-31 RX ADMIN — OXYCODONE HYDROCHLORIDE 10 MILLIGRAM(S): 5 TABLET ORAL at 06:30

## 2017-10-31 RX ADMIN — HEPARIN SODIUM 5000 UNIT(S): 5000 INJECTION INTRAVENOUS; SUBCUTANEOUS at 21:33

## 2017-10-31 RX ADMIN — OXYCODONE HYDROCHLORIDE 5 MILLIGRAM(S): 5 TABLET ORAL at 13:06

## 2017-10-31 RX ADMIN — Medication 112 MICROGRAM(S): at 05:53

## 2017-10-31 RX ADMIN — Medication 1 TABLET(S): at 20:36

## 2017-10-31 RX ADMIN — GABAPENTIN 300 MILLIGRAM(S): 400 CAPSULE ORAL at 13:07

## 2017-10-31 RX ADMIN — Medication 1 TABLET(S): at 10:53

## 2017-10-31 RX ADMIN — OXYCODONE HYDROCHLORIDE 5 MILLIGRAM(S): 5 TABLET ORAL at 14:00

## 2017-10-31 RX ADMIN — OXYCODONE HYDROCHLORIDE 10 MILLIGRAM(S): 5 TABLET ORAL at 05:53

## 2017-10-31 RX ADMIN — Medication 100 MILLIGRAM(S): at 21:34

## 2017-10-31 RX ADMIN — OXYCODONE HYDROCHLORIDE 5 MILLIGRAM(S): 5 TABLET ORAL at 18:27

## 2017-10-31 RX ADMIN — GABAPENTIN 300 MILLIGRAM(S): 400 CAPSULE ORAL at 21:34

## 2017-10-31 RX ADMIN — Medication 100 MILLIGRAM(S): at 13:07

## 2017-10-31 RX ADMIN — GABAPENTIN 300 MILLIGRAM(S): 400 CAPSULE ORAL at 05:52

## 2017-10-31 RX ADMIN — Medication 120 MILLIGRAM(S): at 05:52

## 2017-11-01 PROCEDURE — 71010: CPT | Mod: 26

## 2017-11-01 RX ADMIN — GABAPENTIN 300 MILLIGRAM(S): 400 CAPSULE ORAL at 22:25

## 2017-11-01 RX ADMIN — GABAPENTIN 300 MILLIGRAM(S): 400 CAPSULE ORAL at 13:42

## 2017-11-01 RX ADMIN — Medication 120 MILLIGRAM(S): at 05:35

## 2017-11-01 RX ADMIN — OXYCODONE HYDROCHLORIDE 5 MILLIGRAM(S): 5 TABLET ORAL at 16:10

## 2017-11-01 RX ADMIN — Medication 1 TABLET(S): at 18:58

## 2017-11-01 RX ADMIN — OXYCODONE HYDROCHLORIDE 5 MILLIGRAM(S): 5 TABLET ORAL at 16:50

## 2017-11-01 RX ADMIN — HEPARIN SODIUM 5000 UNIT(S): 5000 INJECTION INTRAVENOUS; SUBCUTANEOUS at 13:42

## 2017-11-01 RX ADMIN — HEPARIN SODIUM 5000 UNIT(S): 5000 INJECTION INTRAVENOUS; SUBCUTANEOUS at 22:25

## 2017-11-01 RX ADMIN — HEPARIN SODIUM 5000 UNIT(S): 5000 INJECTION INTRAVENOUS; SUBCUTANEOUS at 05:35

## 2017-11-01 RX ADMIN — Medication 1 TABLET(S): at 11:00

## 2017-11-01 RX ADMIN — SENNA PLUS 2 TABLET(S): 8.6 TABLET ORAL at 22:25

## 2017-11-01 RX ADMIN — Medication 3 MILLILITER(S): at 20:38

## 2017-11-01 RX ADMIN — Medication 100 MILLIGRAM(S): at 13:42

## 2017-11-01 RX ADMIN — OXYCODONE HYDROCHLORIDE 10 MILLIGRAM(S): 5 TABLET ORAL at 05:35

## 2017-11-01 RX ADMIN — OXYCODONE HYDROCHLORIDE 5 MILLIGRAM(S): 5 TABLET ORAL at 22:30

## 2017-11-01 RX ADMIN — GABAPENTIN 300 MILLIGRAM(S): 400 CAPSULE ORAL at 05:35

## 2017-11-01 RX ADMIN — Medication 1 TABLET(S): at 18:12

## 2017-11-01 RX ADMIN — LIOTHYRONINE SODIUM 5 MICROGRAM(S): 25 TABLET ORAL at 11:36

## 2017-11-01 RX ADMIN — Medication 1 TABLET(S): at 10:11

## 2017-11-01 RX ADMIN — Medication 100 MILLIGRAM(S): at 22:25

## 2017-11-01 RX ADMIN — OXYCODONE HYDROCHLORIDE 5 MILLIGRAM(S): 5 TABLET ORAL at 23:00

## 2017-11-01 RX ADMIN — Medication 112 MICROGRAM(S): at 05:35

## 2017-11-01 NOTE — PROGRESS NOTE ADULT - PROBLEM SELECTOR PLAN 1
See above
See above
-continue ambulation  -Incentive spirometry  - will reduce pain management to Oxy IR 5mg today, as patient will be discharged on tylenol or motrin as outpatient.  - discharge planning for 11/2/17  -preliminary pathology report discussed with patient/Dr Lopez.

## 2017-11-02 ENCOUNTER — TRANSCRIPTION ENCOUNTER (OUTPATIENT)
Age: 62
End: 2017-11-02

## 2017-11-02 VITALS
HEART RATE: 84 BPM | SYSTOLIC BLOOD PRESSURE: 118 MMHG | OXYGEN SATURATION: 99 % | RESPIRATION RATE: 18 BRPM | TEMPERATURE: 97 F | DIASTOLIC BLOOD PRESSURE: 61 MMHG

## 2017-11-02 PROCEDURE — 71010: CPT | Mod: 26

## 2017-11-02 RX ORDER — ACETAMINOPHEN 500 MG
2 TABLET ORAL
Qty: 0 | Refills: 0 | COMMUNITY
Start: 2017-11-02

## 2017-11-02 RX ORDER — SENNA PLUS 8.6 MG/1
2 TABLET ORAL
Qty: 0 | Refills: 0 | COMMUNITY
Start: 2017-11-02

## 2017-11-02 RX ORDER — DOCUSATE SODIUM 100 MG
1 CAPSULE ORAL
Qty: 0 | Refills: 0 | COMMUNITY
Start: 2017-11-02

## 2017-11-02 RX ORDER — OXYCODONE HYDROCHLORIDE 5 MG/1
1 TABLET ORAL
Qty: 30 | Refills: 0 | OUTPATIENT
Start: 2017-11-02

## 2017-11-02 RX ADMIN — Medication 112 MICROGRAM(S): at 07:00

## 2017-11-02 RX ADMIN — Medication 120 MILLIGRAM(S): at 06:59

## 2017-11-02 RX ADMIN — GABAPENTIN 300 MILLIGRAM(S): 400 CAPSULE ORAL at 07:00

## 2017-11-02 RX ADMIN — Medication 100 MILLIGRAM(S): at 06:58

## 2017-11-02 RX ADMIN — HEPARIN SODIUM 5000 UNIT(S): 5000 INJECTION INTRAVENOUS; SUBCUTANEOUS at 06:59

## 2017-11-02 NOTE — PROGRESS NOTE ADULT - NEGATIVE GENERAL SYMPTOMS
no anorexia/no fever/no chills/no sweating
no chills/no anorexia/no fever/no sweating
no fever/no chills/no sweating/no anorexia
no fever/no sweating/no anorexia/no chills
no sweating/no anorexia/no fever/no chills
no chills/no sweating/no anorexia/no fever
no sweating/no fever/no chills/no anorexia
no sweating/no anorexia/no fever/no chills

## 2017-11-02 NOTE — PROGRESS NOTE ADULT - ASSESSMENT
**Neuro**   - Cont with current PO regiment (motrin/tylenol/oxy)  - Pt may bring in migraine medication, it must be approved by pharmacy prior  - Compazine/zofran for nausea  **Pulm**  - Lung reexpanded, continued air leak. Trial CT to WS  - f/u CXR  - OOB to chair, ambulation, incentive spirometer  - Standing Nebz   **Cardio**   - Cont cardiac monitoring, NSR   - propranolol for HTN   **GI**  - Added senna/colace due to narcotics  - Reg diet, no GI prophylaxis at this time  **Renal**   - Pt is successfully voiding self   - Will repeat CMP tomorrow   **Vasc**  - SQH for prophylaxis   **Heme**  - h/h stable  **ID**  - WBC improving, reactive      Tubes: CT to WS
**Neuro**   - Pt home medication, Fioricet, not carried by pharmacy. States all other migraine medications have failed, Family will arrive with home dose and it will need to be approved by pharmacy  - Not using PCA; d/c'ed   - Oral regiment initiated including standing NSAIDs    **Pulm**  - Passive airleak w/ no PTX  - Trial WS, repeat CXR in 4hours   - Out of bed to chair, incentive spirometer   - Standing nebz  **Cardio**   - No acute events, continue monitoring  **GI**   - Compazine/zofran on board for nausea  - Encourage PO intake, no GI prophylaxis at this time  **Renal**   -  Electrolytes WNL  - Pending TOV again today  **Vasc**  - SQH for prophylaxis   **Heme**  - stable h/h  **ID**  - No Abx at this time, leukocytosis reactive     Tubes: CT to WS
63 y/o female POD#6 from right robotic wedge resection, with postop course notable for small air leak and extensive subQ air tracking from right chest to neck.  No air lead today, but due to extensive subQ air will leave on suction today, possible water seal trial tomorrow  Fioricet for headaches  continue synthroid for hypothyroidism  inderal for HTN  COPD cont nebs  DVT prophylaxis  OOB, ambulate  incentive spirometry  Medicine, pulmonary follow up  d/w Dr Lopez
61 yo female s/p R VATS (RML/RLL wedge resections) on 10/25/17.  Post operative is complicated by persistent air leak, which has improved today.  Will leave chest tube on water seal and repeat CXR in AM 11/2/17.  If stable, then will remove chest tube at that time.
63 y/o female POD#5 from right robotic wedge resection, with postop course notable for small air leak and extensive subQ air tracking from right chest to neck.  leave chest tubes on suction for now, will repeat chest xray in AM, patient may require a Pneumostat valve for discharge  Fioricet for headaches  continue synthroid for hypothyroidism  inderal for HTN  COPD cont nebs  DVT prophylaxis  OOB, ambulate  incentive spirometry  Medicine, pulmonary follow up  d/w Dr Lopez
PROGRESS:    Rt Lung Mass-s/p vats with wedge resection- CHEST TUBES  COPD  HTN  Hypothyroidism    PLAN:    oob  ct surgery fu  pain control  incentive spirometry  aerosols  dvt prophylasix
PROGRESS:    Rt Lung Mass-s/p vats with wedge resection- CHEST TUBES/s/q emphysema  COPD  HTN  Hypothyroidism    PLAN:      pulmonary stable to discharge  pain control  incentive spirometry  aerosols  dvt prophylasix
PROGRESS:    Rt Lung Mass-s/p vats with wedge resection- CHEST TUBES/s/q emphysema  COPD  HTN  Hypothyroidism    PLAN:    oob  ct surgery fu  pain control  incentive spirometry  aerosols  dvt prophylasix
PROGRESS:    Rt Lung Mass-s/p vats with wedge resection- CHEST TUBES/s/q emphysema  COPD  HTN  Hypothyroidism    PLAN:    pulmonary stable  ct surgery fu  pain control  incentive spirometry  aerosols  dvt prophylasix
Pt is a 63 y/o female who is a current smoker, COPD, hypothyroidism, HTN, fibromyalgia, lumbar disc disease who was noted to have a 3cm Rt lung nodule.  PET scan was positive, she was optimized and admitted for elective robotic Rt lung wedge resection.  Post-op medicine consult called for comanagement.      * Rt Lung Mass-s/p vats with wedge resection, continue CT management per Dr Lopez ? d/c home with pneumostat, encourage use of incentive spirometry.  * Headaches- Fioricet, supportive care.  * Hypothyroidism-continue synthroid, cytomel  * Leukocytosis-reactive from surgery, resolved.  * HTN-inderal  * COPD-stable, nebulizer  * Tobacco Use-smoking cessation  * Proph- heparin  * Disp-OOB, ambulate, d/c planning per CT sx.  * Comm- d/w pt, RN
Pt is a 63 y/o female who is a current smoker, COPD, hypothyroidism, HTN, fibromyalgia, lumbar disc disease who was noted to have a 3cm Rt lung nodule.  PET scan was positive, she was optimized and admitted for elective robotic Rt lung wedge resection.  Post-op medicine consult called for comanagement.      * Rt Lung Mass-s/p vats with wedge resection, continue CT management per Dr Lopez, monitor sq emphysema, encourage use of incentive spirometry, DVT proph, monitor post-op labs.  * Hypothyroidism-continue synthroid, cytomel  * Leukocytosis-reactive from surgery, resolved.  * HTN-inderal  * COPD-stable, nebulizer  * Tobacco Use-smoking cessation  * Proph- heparin  * Disp-OOB, ambulate  * Comm- d/w pt, RN
Pt is a 63 y/o female who is a current smoker, COPD, hypothyroidism, HTN, fibromyalgia, lumbar disc disease who was noted to have a 3cm Rt lung nodule.  PET scan was positive, she was optimized and admitted for elective robotic Rt lung wedge resection.  Post-op medicine consult called for comanagement.      * Rt Lung Mass-s/p vats with wedge resection, s/p CT removal, f/u repeat CXR,  encourage use of incentive spirometry.  * Headaches- Fioricet, supportive care.  * Hypothyroidism-continue synthroid, cytomel  * HTN-inderal  * COPD-stable, nebulizer  * Tobacco Use-smoking cessation  * Proph- heparin  * Disp-OOB, ambulate, d/c planning   * Comm- d/w pt, RN
Pt is a 61 y/o female who is a current smoker, COPD, hypothyroidism, HTN, fibromyalgia, lumbar disc disease who was noted to have a 3cm Rt lung nodule.  PET scan was positive, she was optimized and admitted for elective robotic Rt lung wedge resection.  Post-op medicine consult called for comanagement.      * Rt Lung Mass-s/p vats with wedge resection, continue CT management, encourage use of incentive spirometry, DVT proph, monitor post-op labs.  * Hypothyroidism-continue synthroid, cytomel  * Leukocytosis-reactive from surgery, monitor for now  * HTN-inderal  * COPD-stable, nebulizer  * Tobacco Use-smoking cessation  * Proph- heparin  * Disp-OOB, ambulate  * Comm- d/w pt, RN
Pt is a 63 y/o female who is a current smoker, COPD, hypothyroidism, HTN, fibromyalgia, lumbar disc disease who was noted to have a 3cm Rt lung nodule.  PET scan was positive, she was optimized and admitted for elective robotic Rt lung wedge resection.  Post-op medicine consult called for comanagement.      * Rt Lung Mass-s/p vats with wedge resection, continue CT management per Dr Lopez, monitor sq emphysema, encourage use of incentive spirometry, DVT proph, monitor post-op labs.  * Headaches-add fioricet  * Hypothyroidism-continue synthroid, cytomel  * Leukocytosis-reactive from surgery, resolved.  * HTN-inderal  * COPD-stable, nebulizer  * Tobacco Use-smoking cessation  * Proph- heparin  * Disp-OOB, ambulate  * Comm- d/w pt, RN
Pt is a 61 y/o female who is a current smoker, COPD, hypothyroidism, HTN, fibromyalgia, lumbar disc disease who was noted to have a 3cm Rt lung nodule.  PET scan was positive, she was optimized and admitted for elective robotic Rt lung wedge resection.  Post-op medicine consult called for comanagement.      * Rt Lung Mass-s/p vats with wedge resection, continue CT management per Dr Lopez, encourage use of incentive spirometry, DVT proph, monitor post-op labs.  * Hypothyroidism-continue synthroid, cytomel  * Leukocytosis-reactive from surgery, monitor for now  * HTN-inderal  * COPD-stable, nebulizer  * Tobacco Use-smoking cessation  * Proph- heparin  * Disp-OOB, ambulate  * Comm- d/w pt, , RN
Pt is a 61 y/o female who is a current smoker, COPD, hypothyroidism, HTN, fibromyalgia, lumbar disc disease who was noted to have a 3cm Rt lung nodule.  PET scan was positive, she was optimized and admitted for elective robotic Rt lung wedge resection.  Post-op medicine consult called for comanagement.      * Rt Lung Mass-s/p vats with wedge resection, continue CT management per Dr Lopez, monitor sq emphysema, encourage use of incentive spirometry, DVT proph, monitor post-op labs.  * Headaches- Fioricet supportive care.  * Hypothyroidism-continue synthroid, cytomel  * Leukocytosis-reactive from surgery, resolved.  * HTN-inderal  * COPD-stable, nebulizer  * Tobacco Use-smoking cessation  * Proph- heparin  * Disp-OOB, ambulate  * Comm- d/w ANASTACIA garsia
Pt is a 63 y/o female who is a current smoker, COPD, hypothyroidism, HTN, fibromyalgia, lumbar disc disease who was noted to have a 3cm Rt lung nodule.  PET scan was positive, she was optimized and admitted for elective robotic Rt lung wedge resection.  Post-op medicine consult called for comanagement.      * Rt Lung Mass-s/p vats with wedge resection, continue CT management per Dr Lopez,  encourage use of incentive spirometry.  * Headaches- Fioricet, supportive care.  * Hypothyroidism-continue synthroid, cytomel  * HTN-inderal  * COPD-stable, nebulizer  * Tobacco Use-smoking cessation  * Proph- heparin  * Disp-OOB, ambulate, d/c planning per CT sx.  * Comm- d/w pt, RN, CT surgery PA

## 2017-11-02 NOTE — DISCHARGE NOTE ADULT - HOSPITAL COURSE
62 F pmhx: HTN, hypothyroid, COPD, fibromyalgia, pulmonary nodule s/p 10/25 Right VATS w/ RML/RLL wedge resection. post op course complicated by air leak and subcutaneous air which resolved. Chest tube removed POD8 and patient discharged home

## 2017-11-02 NOTE — DISCHARGE NOTE ADULT - PLAN OF CARE
await final pathology ambulate as tolerated  you may shower in 48hrs, allow dressing to become saturated and remove in shower. The blue suture will be removed by Dr. Lopez in the office. There is no need to cover the area. Some drainage is expected, it it becomes malodorous or frankly bloody, please call the office otherwise cover the area with gauze as it should stop in a day or so

## 2017-11-02 NOTE — PROGRESS NOTE ADULT - COMMENTS
All other ROS negative

## 2017-11-02 NOTE — PROGRESS NOTE ADULT - RS GEN PE MLT RESP DETAILS PC
good air movement/rales/subcutaneous emphysema/diminished breath sounds, R
rales
diminished breath sounds, R/subcutaneous emphysema
breath sounds equal/good air movement
rales/clear to auscultation bilaterally/subcutaneous emphysema
good air movement/diminished breath sounds, R
rales/subcutaneous emphysema
subcutaneous emphysema/breath sounds equal/diminished breath sounds, R

## 2017-11-02 NOTE — PROGRESS NOTE ADULT - CARDIOVASCULAR DETAILS
positive S1/positive S2
positive S2/positive S1
positive S2/positive S1
positive S1/positive S2
positive S2/positive S1
positive S1/positive S2
positive S2/positive S1
positive S2/positive S1

## 2017-11-02 NOTE — DIETITIAN INITIAL EVALUATION ADULT. - PERTINENT MEDS FT
heparin, Colace, Senna, Oxycodone, COmpazine, Duoneb, Neurontin, Synthroid, Cytomel, Narcan, Zofran, Indeal [place]

## 2017-11-02 NOTE — PROGRESS NOTE ADULT - RESPIRATORY AND THORAX COMMENTS
mild discomfort from surgery site
Rt chest wall pain from Rt chest tube
mild discomfort from Rt CT
Rt chest wall pain from Rt chest tube
Rt chest wall pain from Rt chest tube

## 2017-11-02 NOTE — PROGRESS NOTE ADULT - NEUROLOGICAL DETAILS
alert and oriented x 3

## 2017-11-02 NOTE — PROGRESS NOTE ADULT - MS EXT PE MLT D E PC
no clubbing/no cyanosis
no cyanosis/no clubbing
no cyanosis/no clubbing
no clubbing/no cyanosis

## 2017-11-02 NOTE — DISCHARGE NOTE ADULT - CARE PROVIDERS DIRECT ADDRESSES
,[email or web address],DirectAddress_Unknown,DirectAddress_Unknown

## 2017-11-02 NOTE — PROGRESS NOTE ADULT - GASTROINTESTINAL DETAILS
bowel sounds normal/no distention/nontender/soft
no distention/nontender/soft/bowel sounds normal
bowel sounds normal/nontender/no distention/soft
bowel sounds normal/nontender/soft/no distention
no distention/soft/bowel sounds normal/nontender
no distention/soft/nontender/bowel sounds normal
nontender/soft/bowel sounds normal/no distention
soft/no distention/bowel sounds normal/nontender

## 2017-11-02 NOTE — DIETITIAN INITIAL EVALUATION ADULT. - OTHER INFO
Consult for length of stay. Pt denies chewing or swallowing difficulty. Pt denies n/v/d/c. Pt reports good appetite no complaints, pt d.c to home.

## 2017-11-02 NOTE — DISCHARGE NOTE ADULT - NS AS ACTIVITY OBS
Do not drive or operate machinery/Stairs allowed/Do not make important decisions/No Heavy lifting/straining/Walking-Outdoors allowed/Walking-Indoors allowed/Showering allowed

## 2017-11-02 NOTE — PROGRESS NOTE ADULT - RESPIRATORY COMMENTS
Rt chest tube in place with subcutaneous emphysema
Rt chest tube in place, Rt posterior chest crepitus
Subcutaneous emphysema on the Rt posteriorly
Rt CT in place
Rt sided with milld Sq emphysema, Lt side clean.
Rt CT in place
Rt CT with posterior crepitus
Rt chest tube with posterior crepitus

## 2017-11-02 NOTE — DISCHARGE NOTE ADULT - MEDICATION SUMMARY - MEDICATIONS TO TAKE
I will START or STAY ON the medications listed below when I get home from the hospital:    Advil 200 mg oral tablet  -- 2 tab(s) by mouth every 6 hours  -- Indication: For pain    acetaminophen 325 mg oral tablet  -- 2 tab(s) by mouth every 6 hours, As needed, Mild Pain (1 - 3)  -- Indication: For pain    oxyCODONE 5 mg oral tablet  -- 1 tab(s) by mouth every 4 hours, As needed, Moderate Pain (4 - 6) MDD:6 tabs  -- Indication: For pain    Inderal  mg oral capsule, extended release  -- 1 cap(s) by mouth once a day (at bedtime)  -- Indication: For antiarrhythmic    Neurontin 300 mg oral capsule  -- 1 cap(s) by mouth 3 times a day  -- Indication: For fibromyalgia    docusate sodium 100 mg oral capsule  -- 1 cap(s) by mouth 3 times a day  -- Indication: For constipation    senna oral tablet  -- 2 tab(s) by mouth once a day (at bedtime)  -- Indication: For constipation    Flexeril 10 mg oral tablet  -- 1 tab(s) by mouth 3 times a day, As Needed  -- Indication: For fibromyalgia    levothyroxine 112 mcg (0.112 mg) oral tablet  -- 1 tab(s) by mouth once a day  -- Indication: For hypothyroid    Cytomel  -- 10 microgram(s) by mouth 2 times a week  -- Indication: For hypothyroid    Daily Multi oral tablet  -- 1 tab(s) by mouth once a day  -- Indication: For Supplement

## 2017-11-02 NOTE — DISCHARGE NOTE ADULT - PATIENT PORTAL LINK FT
“You can access the [facility] Patient Portal, offered by [facility], by registering with the following website: [email or web address]”

## 2017-11-02 NOTE — PROGRESS NOTE ADULT - PROVIDER SPECIALTY LIST ADULT
CT Surgery
Internal Medicine
Pulmonology
Thoracic Surgery
Internal Medicine
Internal Medicine

## 2017-11-02 NOTE — DISCHARGE NOTE ADULT - CARE PLAN
Principal Discharge DX:	Lung mass  Goal:	await final pathology  Instructions for follow-up, activity and diet:	ambulate as tolerated  you may shower in 48hrs, allow dressing to become saturated and remove in shower. The blue suture will be removed by Dr. Lopez in the office. There is no need to cover the area. Some drainage is expected, it it becomes malodorous or frankly bloody, please call the office otherwise cover the area with gauze as it should stop in a day or so

## 2017-11-02 NOTE — DISCHARGE NOTE ADULT - ADDITIONAL INSTRUCTIONS
Follow up with Dr. Lopez in 7-10 days . Please call for an appointment [phone]. Please obtain a chest xray prior to your appointment and bring the films with you.  Follow up with your PMD in one week

## 2017-11-02 NOTE — PROGRESS NOTE ADULT - PSYCHIATRIC DETAILS
normal affect/normal behavior
normal behavior/normal affect
normal behavior/normal affect
normal affect/normal behavior

## 2017-11-02 NOTE — PROGRESS NOTE ADULT - SUBJECTIVE AND OBJECTIVE BOX
63 yo female who is a current smoker, COPD, hypothyroidism, HTN, fibromyalgia, lumbar disc disease who was noted to have a 3cm Rt lung nodule, PET positive lesion. Currently POD#2 Robo asssited RVATS, RML/RLL wedge resection.     10/26 Pt was nauseous overnight, complains of migraines, she also required x1 straight cath for failed TOV after OR. Currently pt continues to complain of headache, nausea has improved and admits to mild pain at CT site.  10/27 No acute events overnight, states headache improved yesterday on current regiment. Denies chest pain, fever, chills, nausea, vomiting, and headache.       Vital Signs:  Vital Signs Last 24 Hrs  T(C): 37.2 (10-27-17 @ 12:05), Max: 37.2 (10-27-17 @ 12:05)  T(F): 99 (10-27-17 @ 12:05), Max: 99 (10-27-17 @ 12:05)  HR: 84 (10-27-17 @ 12:24) (79 - 102)  BP: 120/46 (10-27-17 @ 04:00) (113/57 - 128/54)  RR: 18 (10-27-17 @ 12:24) (10 - 26)  SpO2: 97% (10-27-17 @ 12:24) (88% - 100%) on (O2)    Telemetry/Alarms: 88 NSR  Gen: Aox3 lying in bed, no acute distress  Pulm: Lungs clear to ausculation, mild crepitus around chest tube site  Card: s1/s2 rrr no murmur  Abd: Soft, non-tender, hypoactive bowel sounds   LE: No edema, non-tender  CT: Suction/FEAL 196ml serous sanguinous    CXR: No PTX, lung reexpanded from yesterday    Relevant labs, radiology and Medications reviewed                        11.7   11.2  )-----------( 219      ( 27 Oct 2017 05:54 )             33.9     10-26    138  |  104  |  10  ----------------------------<  132<H>  4.8   |  28  |  0.67    Ca    8.8      26 Oct 2017 05:43        Pertinent Physical Exam  I&O's Summary    26 Oct 2017 07:01  -  27 Oct 2017 07:00  --------------------------------------------------------  IN: 400 mL / OUT: 3046 mL / NET: -2646 mL        Assessment  62y Female  w/ PAST MEDICAL & SURGICAL HISTORY:  Mitral valve prolapse  Tinnitus  Hearing loss: right ear with tinnitus  Trigeminal neuralgia  Neck swelling: b/l swelling superior to clavicles  Lumbar disc herniation  Emphysema of lung  Cervical disc disease  Back pain  Neck pain  Skin cancer: right thigh  Cigarette smoker  Lung mass: right side  Palpitation  Thyroid disease  Migraine  S/P endoscopy  S/P colonoscopy  S/P tonsillectomy: 1965  H/O left breast biopsy: 2000. 2001  H/O Moh's micrographic surgery for skin cancer: right thigh 2015   Patient is a 62y old  Female who presents with a chief complaint of flexible bronchoscopy/ right lung resection (25 Oct 2017 08:53)  .  On (Date), patient underwent Wedge resection of right lung  .     Postoperative course/issues:                                                                                                        PLAN    **Neuro**   - Cont with current PO regiment (motrin/tylenol/oxy)  - Pt may bring in migraine medication, it must be approved by pharmacy prior  - Compazine/zofran for nausea  **Pulm**  - Lung reexpanded, continued air leak. Trial CT to WS  - f/u CXR  - OOB to chair, ambulation, incentive spirometer  - Standing Nebz   **Cardio**   - Cont cardiac monitoring, NSR   - propranolol for HTN   **GI**  - Added senna/colace due to narcotics  - Reg diet, no GI prophylaxis at this time  **Renal**   - Pt is successfully voiding self   - Will repeat CMP tomorrow   **Vasc**  - SQH for prophylaxis   **Heme**  - h/h stable  **ID**  - WBC improving, reactive      Tubes: CT to WS
Pt is a 63 y/o female who is a current smoker, COPD, hypothyroidism, HTN, fibromyalgia, lumbar disc disease who was noted to have a 3cm Rt lung nodule, PET positive lesion. Currently POD#1 Robo asssited RVATS, RML/RLL wedge resection. Pt was nauseous overnight, complains of migraines, she also required x1 straight cath for failed TOV after OR. Currently pt continues to complain of headache, nausea has improved and admits to mild pain at CT site.    Vital Signs:  Vital Signs Last 24 Hrs  T(C): 36.2 (10-26-17 @ 09:45), Max: 36.4 (10-25-17 @ 13:30)  T(F): 97.1 (10-26-17 @ 09:45), Max: 97.5 (10-25-17 @ 13:30)  HR: 76 (10-26-17 @ 10:00) (50 - 93)  BP: 114/51 (10-26-17 @ 10:00) (105/59 - 141/75)  RR: 13 (10-26-17 @ 10:00) (7 - 27)  SpO2: 98% (10-26-17 @ 10:00) (97% - 100%) on (O2)    Telemetry/Alarms: Sinus tach on monitor  Gen: AOx3, no acute distress   Pulm: Lungs clear to auscultation b/l  Card: s1/s2 rrr no murmur   Abd: Soft non-tender   LE: No edema, non-tender  CT: Suct/Passive     CXR: No PTX    Relevant labs, radiology and Medications reviewed                        13.0   15.0  )-----------( 255      ( 26 Oct 2017 05:43 )             36.8     10-26    138  |  104  |  10  ----------------------------<  132<H>  4.8   |  28  |  0.67    Ca    8.8      26 Oct 2017 05:43        Pertinent Physical Exam  I&O's Summary    25 Oct 2017 07:01  -  26 Oct 2017 07:00  --------------------------------------------------------  IN: 1800 mL / OUT: 1309 mL / NET: 491 mL    26 Oct 2017 07:01  -  26 Oct 2017 10:52  --------------------------------------------------------  IN: 100 mL / OUT: 0 mL / NET: 100 mL        Assessment  62y Female  w/ PAST MEDICAL & SURGICAL HISTORY:  Mitral valve prolapse  Tinnitus  Hearing loss: right ear with tinnitus  Trigeminal neuralgia  Neck swelling: b/l swelling superior to clavicles  Lumbar disc herniation  Emphysema of lung  Cervical disc disease  Back pain  Neck pain  Skin cancer: right thigh  Cigarette smoker  Lung mass: right side  Palpitation  Thyroid disease  Migraine  S/P endoscopy  S/P colonoscopy  S/P tonsillectomy: 1965  H/O left breast biopsy: 2000. 2001  H/O Moh's micrographic surgery for skin cancer: right thigh 2015   Patient is a 62y old  Female who presents with a chief complaint of flexible bronchoscopy/ right lung resection (25 Oct 2017 08:53)  .  On (Date), patient underwent Wedge resection of right lung  .     Postoperative course/issues:                                                                                                        PLAN    **Neuro**   - Pt home medication, Fioricet, not carried by pharmacy. States all other migraine medications have failed, Family will arrive with home dose and it will need to be approved by pharmacy  - Not using PCA; d/c'ed   - Oral regiment initiated including standing NSAIDs    **Pulm**  - Passive airleak w/ no PTX  - Trial WS, repeat CXR in 4hours   - Out of bed to chair, incentive spirometer   - Standing nebz  **Cardio**   - No acute events, continue monitoring  **GI**   - Compazine/zofran on board for nausea  - Encourage PO intake, no GI prophylaxis at this time  **Renal**   -  Electrolytes WNL  - Pending TOV again today  **Vasc**  - SQH for prophylaxis   **Heme**  - stable h/h  **ID**  - No Abx at this time, leukocytosis reactive     Tubes: CT to WS    Discussed with Cardiothoracic Team at AM rounds.
Subjective:  Pt is a 63 y/o female who is a current smoker, COPD, hypothyroidism, HTN, fibromyalgia, lumbar disc disease who was noted to have a 3cm Rt lung nodule, PET positive lesion. Pt underwent a Robotic  assisted RVATS, RML/RLL wedge resection on 10/25/17.     10/26  Pt was nauseous overnight, complains of migraines, she also required x1 straight cath for failed TOV after OR. Currently pt continues to complain of headache, nausea has improved and admits to mild pain at CT site.    10/27 No acute events overnight, states headache improved yesterday on current regiment. Denies chest pain, fever, chills, nausea, vomiting, and headache.     10/28 No acute events, CXR improved subQ air, small air leak    10/29 No acute events, chest tube to water seal with air leak and moderate subQ air, placed back on Suction    10/30 No acute events, patient feeling increasing pressure around neck when chest tube is off suction. Chest Xray today on suction is improved, less subQ air, however today during water seal trial patient had noticeable increase in subQ air around chest and neck, chest tube placed back on suction.    10/31 No acute events, patient feels less pressure around neck due to subQ air, chest tube on suction, no air leak today    Tele:                                                              T(C): 36.6 (10-31-17 @ 08:33), Max: 36.6 (10-31-17 @ 08:33)  HR: 78 (10-31-17 @ 11:00) (69 - 93)  BP: 113/66 (10-31-17 @ 11:00) (110/56 - 130/53)  SpO2: 99% (10-31-17 @ 11:00) (90% - 100%)    CXR:  < from: Xray Chest 1 View AP/PA. (10.31.17 @ 09:14) >  FINDINGS:  Right chest tube in place. No pneumothorax. No change in   extensive right chest wall and bilateral neck subcutaneous gas.  There are new patchy opacities in the bilateral lower lungs.    IMPRESSION:  No pneumothorax.    New bilateral lower lung opacities which could be due to pneumonia in the   proper clinical setting, versus atelectasis. Attention to these areas on   follow-up chest x-rays isrecommended.    < end of copied text >      I&O's Detail    30 Oct 2017 07:01  -  31 Oct 2017 07:00  --------------------------------------------------------  IN:  Total IN: 0 mL    OUT:    Chest Tube: 90 mL    Voided: 1100 mL  Total OUT: 1190 mL    Total NET: -1190 mL    CHEST TUBE:  [x ] YES [ ] NO  OUTPUT:    90cc per 24 hours    AIR LEAK:  [ ] YES [x ] NO      Drug Dosing Weight  Height (cm): 168.91 (25 Oct 2017 08:44)  Weight (kg): 61.2 (25 Oct 2017 08:44)  BMI (kg/m2): 21.5 (25 Oct 2017 08:44)  BSA (m2): 1.7 (25 Oct 2017 08:44)    ALBUTerol/ipratropium for Nebulization 3 milliLiter(s) Nebulizer every 6 hours  docusate sodium 100 milliGRAM(s) Oral three times a day  levothyroxine 112 MICROGram(s) Oral daily  liothyronine 5 MICROGram(s) Oral every 3 days  propranolol  milliGRAM(s) Oral daily  senna 2 Tablet(s) Oral at bedtime      Assessment  Neuro: awake and alert  Pulm: extensive crepitis around right chest tracking up to neck, otherwise CTA  CV: S1, S2  Abd: Soft, nontender, +BS  Extrem/MS: Moves all ext, no edema  Incision(s)/ports: clean, dry, intact    Assessment:  62yFemale with PAST MEDICAL & SURGICAL HISTORY:  Mitral valve prolapse  Tinnitus  Hearing loss: right ear with tinnitus  Trigeminal neuralgia  Neck swelling: b/l swelling superior to clavicles  Lumbar disc herniation  Emphysema of lung  Cervical disc disease  Back pain  Neck pain  Skin cancer: right thigh  Cigarette smoker  Lung mass: right side  Palpitation  Thyroid disease  Migraine  S/P endoscopy  S/P colonoscopy  S/P tonsillectomy: 1965  H/O left breast biopsy: 2000. 2001  H/O Moh's micrographic surgery for skin cancer: right thigh 2015
C/O headache  VSS afebrile\  CT small air leak  WBC 8  HCT 34  CXR improved sq air    A/P   PT  Toradol for pain
CT placed on water seal.  C/O crepitus  VSS afebrile    CT air leak.  moderate credpuitus.  worse from yesterday    Place CT to suction
Operation / Date: RML/RLL Wedge resections via R VATS 10/25/17    SUBJECTIVE ASSESSMENT:  62y Female sitting in chair, no acute distress, speaking comfortably.        Vital Signs Last 24 Hrs  T(C): 36.2 (01 Nov 2017 09:04), Max: 36.6 (01 Nov 2017 09:00)  T(F): 97.2 (01 Nov 2017 09:04), Max: 97.9 (01 Nov 2017 09:00)  HR: 74 (01 Nov 2017 09:00) (70 - 86)  BP: 104/40 (01 Nov 2017 09:00) (104/40 - 138/60)  BP(mean): 55 (01 Nov 2017 09:00) (55 - 78)  RR: --  SpO2: 98% (01 Nov 2017 07:00) (95% - 98%)  I&O's Detail    31 Oct 2017 07:01  -  01 Nov 2017 07:00  --------------------------------------------------------  IN:  Total IN: 0 mL    OUT:    Chest Tube: 80 mL  Total OUT: 80 mL    Total NET: -80 mL          CHEST TUBE:  Yes. AIR LEAKS: No. Suction     DUTTON: No.    PHYSICAL EXAM:    General: alert, calm, no distress    Neurological: A+O X3    Cardiovascular: S1/S2 RRR    Respiratory: bilateral crackles scattered    Gastrointestinal:  Soft nontender    Extremities:  no edema    Incision Sites:  right chest tube/vats sites clean and dry. no erythema.      LABS:  WBC 8.2, HCT 35 Platelets 254              Na+ 135, BUN 7, Creatinine 0.58                  MEDICATIONS  (STANDING):  ALBUTerol/ipratropium for Nebulization 3 milliLiter(s) Nebulizer every 6 hours  docusate sodium 100 milliGRAM(s) Oral three times a day  gabapentin 300 milliGRAM(s) Oral three times a day  heparin  Injectable 5000 Unit(s) SubCutaneous every 8 hours  levothyroxine 112 MICROGram(s) Oral daily  liothyronine 5 MICROGram(s) Oral every 3 days  propranolol  milliGRAM(s) Oral daily  senna 2 Tablet(s) Oral at bedtime    MEDICATIONS  (PRN):  acetaminophen   Tablet. 650 milliGRAM(s) Oral every 6 hours PRN Mild Pain (1 - 3)  acetaminophen 325 mG/butalbital 50 mG/caffeine 40 mG 1 Tablet(s) Oral every 6 hours PRN headache  naloxone Injectable 0.1 milliGRAM(s) IV Push every 3 minutes PRN For ANY of the following changes in patient status:  A. RR LESS THAN 10 breaths per minute, B. Oxygen saturation LESS THAN 90%, C. Sedation score of 6  ondansetron Injectable 4 milliGRAM(s) IV Push every 6 hours PRN Nausea  oxyCODONE    IR 5 milliGRAM(s) Oral every 4 hours PRN Moderate Pain (4 - 6)  prochlorperazine   Injectable 10 milliGRAM(s) IV Push every 6 hours PRN nausea      RADIOLOGY & ADDITIONAL TESTS:    CXR 11/1/17 reviewed with Dr Lopez.  Official report pending:  Right Chest tube was placed on H2O seal 1 hour prior to CXR.   No PTX noted, extensive SC air is again noted.
Pt c/o headache this morning, no CP or SOB.    Vital Signs Last 24 Hrs  T(C): 36.4 (30 Oct 2017 06:00), Max: 37.2 (29 Oct 2017 17:57)  T(F): 97.6 (30 Oct 2017 06:00), Max: 99 (29 Oct 2017 17:57)  HR: 85 (30 Oct 2017 06:00) (68 - 95)  BP: 132/54 (30 Oct 2017 06:00) (116/45 - 136/57)  BP(mean): 72 (30 Oct 2017 06:00) (63 - 77)  RR: 16 (30 Oct 2017 05:00) (12 - 22)  SpO2: 93% (30 Oct 2017 06:00) (92% - 100%)    Daily     Daily     I&O's Detail    29 Oct 2017 07:01  -  30 Oct 2017 07:00  --------------------------------------------------------  IN:  Total IN: 0 mL    OUT:    Chest Tube: 75 mL    Voided: 650 mL  Total OUT: 725 mL    Total NET: -725 mL          CAPILLARY BLOOD GLUCOSE                                                MEDICATIONS  (STANDING):  ALBUTerol/ipratropium for Nebulization 3 milliLiter(s) Nebulizer every 6 hours  docusate sodium 100 milliGRAM(s) Oral three times a day  gabapentin 300 milliGRAM(s) Oral three times a day  heparin  Injectable 5000 Unit(s) SubCutaneous every 8 hours  levothyroxine 112 MICROGram(s) Oral daily  liothyronine 5 MICROGram(s) Oral every 3 days  propranolol  milliGRAM(s) Oral daily  senna 2 Tablet(s) Oral at bedtime    MEDICATIONS  (PRN):  acetaminophen   Tablet. 650 milliGRAM(s) Oral every 6 hours PRN Mild Pain (1 - 3)  acetaminophen 325 mG/butalbital 50 mG/caffeine 40 mG 1 Tablet(s) Oral every 6 hours PRN headache  naloxone Injectable 0.1 milliGRAM(s) IV Push every 3 minutes PRN For ANY of the following changes in patient status:  A. RR LESS THAN 10 breaths per minute, B. Oxygen saturation LESS THAN 90%, C. Sedation score of 6  ondansetron Injectable 4 milliGRAM(s) IV Push every 6 hours PRN Nausea  oxyCODONE    IR 5 milliGRAM(s) Oral every 4 hours PRN Moderate Pain (4 - 6)  oxyCODONE    IR 10 milliGRAM(s) Oral every 6 hours PRN Severe Pain (7 - 10)  prochlorperazine   Injectable 10 milliGRAM(s) IV Push every 6 hours PRN nausea
Pt c/o mild Rt chest tube pain, no SOB.    Vital Signs Last 24 Hrs  T(C): 36.2 (26 Oct 2017 05:14), Max: 36.4 (25 Oct 2017 13:30)  T(F): 97.1 (26 Oct 2017 05:14), Max: 97.5 (25 Oct 2017 13:30)  HR: 83 (26 Oct 2017 09:00) (50 - 93)  BP: 114/64 (26 Oct 2017 09:00) (105/59 - 141/75)  BP(mean): 74 (26 Oct 2017 09:00) (65 - 109)  RR: 16 (26 Oct 2017 09:00) (7 - 27)  SpO2: 97% (26 Oct 2017 09:00) (97% - 100%)    Daily     Daily     I&O's Detail    25 Oct 2017 07:01  -  26 Oct 2017 07:00  --------------------------------------------------------  IN:    Other: 900 mL    sodium chloride 0.9%: 150 mL    sodium chloride 0.9%.: 750 mL  Total IN: 1800 mL    OUT:    Chest Tube: 109 mL    Intermittent Catheterization - Urethral: 1200 mL  Total OUT: 1309 mL    Total NET: 491 mL      26 Oct 2017 07:01  -  26 Oct 2017 10:30  --------------------------------------------------------  IN:    sodium chloride 0.9%.: 100 mL  Total IN: 100 mL    OUT:  Total OUT: 0 mL    Total NET: 100 mL          CAPILLARY BLOOD GLUCOSE                                          13.0   15.0  )-----------( 255      ( 26 Oct 2017 05:43 )             36.8       10-26    138  |  104  |  10  ----------------------------<  132<H>  4.8   |  28  |  0.67    Ca    8.8      26 Oct 2017 05:43                    Culture Results:   Testing in progress (10-25 @ 13:46)      MEDICATIONS  (STANDING):  ALBUTerol/ipratropium for Nebulization 3 milliLiter(s) Nebulizer every 6 hours  gabapentin 300 milliGRAM(s) Oral three times a day  heparin  Injectable 5000 Unit(s) SubCutaneous every 8 hours  ibuprofen  Tablet 400 milliGRAM(s) Oral every 6 hours  levothyroxine 112 MICROGram(s) Oral daily  liothyronine 5 MICROGram(s) Oral every 3 days  propranolol  milliGRAM(s) Oral daily  sodium chloride 0.9%. 1000 milliLiter(s) (50 mL/Hr) IV Continuous <Continuous>    MEDICATIONS  (PRN):  acetaminophen   Tablet. 650 milliGRAM(s) Oral every 6 hours PRN Mild Pain (1 - 3)  naloxone Injectable 0.1 milliGRAM(s) IV Push every 3 minutes PRN For ANY of the following changes in patient status:  A. RR LESS THAN 10 breaths per minute, B. Oxygen saturation LESS THAN 90%, C. Sedation score of 6  ondansetron Injectable 4 milliGRAM(s) IV Push every 6 hours PRN Nausea  oxyCODONE    IR 5 milliGRAM(s) Oral every 4 hours PRN Moderate Pain (4 - 6)  oxyCODONE    IR 10 milliGRAM(s) Oral every 6 hours PRN Severe Pain (7 - 10)  prochlorperazine   Injectable 10 milliGRAM(s) IV Push every 6 hours PRN nausea  SUMAtriptan 25 milliGRAM(s) Oral daily PRN Migraine. may repeat once after 2hrs if needed.
Pt c/o mild Rt chest tube site pain, no SOB, fevers or chills.  Pt c/o headache.    Vital Signs Last 24 Hrs  T(C): 36.3 (29 Oct 2017 08:41), Max: 37.3 (28 Oct 2017 14:24)  T(F): 97.4 (29 Oct 2017 08:41), Max: 99.1 (28 Oct 2017 14:24)  HR: 68 (29 Oct 2017 09:31) (67 - 89)  BP: 123/53 (29 Oct 2017 09:31) (123/53 - 131/66)  BP(mean): 69 (29 Oct 2017 09:31) (69 - 78)  RR: 15 (29 Oct 2017 09:31) (10 - 25)  SpO2: 96% (29 Oct 2017 09:31) (86% - 100%)    Daily     Daily Weight in k.8 (29 Oct 2017 06:25)    I&O's Detail    28 Oct 2017 07:01  -  29 Oct 2017 07:00  --------------------------------------------------------  IN:  Total IN: 0 mL    OUT:    Chest Tube: 90 mL    Voided: 600 mL  Total OUT: 690 mL    Total NET: -690 mL          CAPILLARY BLOOD GLUCOSE                                          12.4   8.2   )-----------( 254      ( 28 Oct 2017 06:09 )             35.0       10-    135  |  102  |  7   ----------------------------<  96  3.5   |  26  |  0.58    Ca    8.8      28 Oct 2017 06:09                        MEDICATIONS  (STANDING):  ALBUTerol/ipratropium for Nebulization 3 milliLiter(s) Nebulizer every 6 hours  docusate sodium 100 milliGRAM(s) Oral three times a day  gabapentin 300 milliGRAM(s) Oral three times a day  heparin  Injectable 5000 Unit(s) SubCutaneous every 8 hours  ketorolac   Injectable 15 milliGRAM(s) IV Push every 6 hours  levothyroxine 112 MICROGram(s) Oral daily  liothyronine 5 MICROGram(s) Oral every 3 days  propranolol  milliGRAM(s) Oral daily  senna 2 Tablet(s) Oral at bedtime    MEDICATIONS  (PRN):  acetaminophen   Tablet. 650 milliGRAM(s) Oral every 6 hours PRN Mild Pain (1 - 3)  naloxone Injectable 0.1 milliGRAM(s) IV Push every 3 minutes PRN For ANY of the following changes in patient status:  A. RR LESS THAN 10 breaths per minute, B. Oxygen saturation LESS THAN 90%, C. Sedation score of 6  ondansetron Injectable 4 milliGRAM(s) IV Push every 6 hours PRN Nausea  oxyCODONE    IR 5 milliGRAM(s) Oral every 4 hours PRN Moderate Pain (4 - 6)  oxyCODONE    IR 10 milliGRAM(s) Oral every 6 hours PRN Severe Pain (7 - 10)  prochlorperazine   Injectable 10 milliGRAM(s) IV Push every 6 hours PRN nausea
Pt with uneventful night, c/o mild Rt CT pain.      Vital Signs Last 24 Hrs  T(C): 36.7 (27 Oct 2017 06:23), Max: 36.7 (27 Oct 2017 06:23)  T(F): 98 (27 Oct 2017 06:23), Max: 98 (27 Oct 2017 06:23)  HR: 82 (27 Oct 2017 06:00) (79 - 102)  BP: 120/46 (27 Oct 2017 04:00) (113/57 - 128/54)  BP(mean): 64 (27 Oct 2017 04:00) (58 - 76)  RR: 15 (27 Oct 2017 06:00) (10 - 26)  SpO2: 100% (27 Oct 2017 06:00) (86% - 100%)    Daily     Daily Weight in k.5 (27 Oct 2017 06:23)    I&O's Detail    26 Oct 2017 07:01  -  27 Oct 2017 07:00  --------------------------------------------------------  IN:    sodium chloride 0.9%: 400 mL  Total IN: 400 mL    OUT:    Chest Tube: 196 mL    Voided: 2850 mL  Total OUT: 3046 mL    Total NET: -2646 mL          CAPILLARY BLOOD GLUCOSE                                          11.7   11.2  )-----------( 219      ( 27 Oct 2017 05:54 )             33.9       10-26    138  |  104  |  10  ----------------------------<  132<H>  4.8   |  28  |  0.67    Ca    8.8      26 Oct 2017 05:43                        MEDICATIONS  (STANDING):  ALBUTerol/ipratropium for Nebulization 3 milliLiter(s) Nebulizer every 6 hours  gabapentin 300 milliGRAM(s) Oral three times a day  heparin  Injectable 5000 Unit(s) SubCutaneous every 8 hours  ibuprofen  Tablet 400 milliGRAM(s) Oral every 6 hours  levothyroxine 112 MICROGram(s) Oral daily  liothyronine 5 MICROGram(s) Oral every 3 days  propranolol  milliGRAM(s) Oral daily    MEDICATIONS  (PRN):  acetaminophen   Tablet. 650 milliGRAM(s) Oral every 6 hours PRN Mild Pain (1 - 3)  naloxone Injectable 0.1 milliGRAM(s) IV Push every 3 minutes PRN For ANY of the following changes in patient status:  A. RR LESS THAN 10 breaths per minute, B. Oxygen saturation LESS THAN 90%, C. Sedation score of 6  ondansetron Injectable 4 milliGRAM(s) IV Push every 6 hours PRN Nausea  oxyCODONE    IR 5 milliGRAM(s) Oral every 4 hours PRN Moderate Pain (4 - 6)  oxyCODONE    IR 10 milliGRAM(s) Oral every 6 hours PRN Severe Pain (7 - 10)  prochlorperazine   Injectable 10 milliGRAM(s) IV Push every 6 hours PRN nausea
Pt with uneventful night, no CP or SOB.    Vital Signs Last 24 Hrs  T(C): 36.5 (2017 06:16), Max: 36.6 (31 Oct 2017 08:33)  T(F): 97.7 (2017 06:16), Max: 97.8 (31 Oct 2017 08:33)  HR: 86 (2017 05:00) (70 - 86)  BP: 117/54 (2017 05:00) (106/44 - 138/60)  BP(mean): 70 (2017 05:00) (59 - 78)  RR: --  SpO2: 95% (2017 05:00) (95% - 99%)    Daily     Daily Weight in k.6 (2017 06:16)    I&O's Detail    31 Oct 2017 07:01  -  2017 07:00  --------------------------------------------------------  IN:  Total IN: 0 mL    OUT:    Chest Tube: 80 mL  Total OUT: 80 mL    Total NET: -80 mL          CAPILLARY BLOOD GLUCOSE                                                MEDICATIONS  (STANDING):  ALBUTerol/ipratropium for Nebulization 3 milliLiter(s) Nebulizer every 6 hours  docusate sodium 100 milliGRAM(s) Oral three times a day  gabapentin 300 milliGRAM(s) Oral three times a day  heparin  Injectable 5000 Unit(s) SubCutaneous every 8 hours  levothyroxine 112 MICROGram(s) Oral daily  liothyronine 5 MICROGram(s) Oral every 3 days  propranolol  milliGRAM(s) Oral daily  senna 2 Tablet(s) Oral at bedtime    MEDICATIONS  (PRN):  acetaminophen   Tablet. 650 milliGRAM(s) Oral every 6 hours PRN Mild Pain (1 - 3)  acetaminophen 325 mG/butalbital 50 mG/caffeine 40 mG 1 Tablet(s) Oral every 6 hours PRN headache  naloxone Injectable 0.1 milliGRAM(s) IV Push every 3 minutes PRN For ANY of the following changes in patient status:  A. RR LESS THAN 10 breaths per minute, B. Oxygen saturation LESS THAN 90%, C. Sedation score of 6  ondansetron Injectable 4 milliGRAM(s) IV Push every 6 hours PRN Nausea  oxyCODONE    IR 5 milliGRAM(s) Oral every 4 hours PRN Moderate Pain (4 - 6)  prochlorperazine   Injectable 10 milliGRAM(s) IV Push every 6 hours PRN nausea
Subjective:    pat better, no more air leak, no respiratory complaint.    Home Medications:  Advil 200 mg oral tablet: 2 tab(s) orally every 6 hours (25 Oct 2017 09:05)  Cytomel: 10 microgram(s) orally 2 times a week (25 Oct 2017 09:05)  Daily Multi oral tablet: 1 tab(s) orally once a day (25 Oct 2017 09:05)  Flexeril 10 mg oral tablet: 1 tab(s) orally 3 times a day, As Needed (25 Oct 2017 09:05)  Inderal  mg oral capsule, extended release: 1 cap(s) orally once a day (at bedtime) (25 Oct 2017 09:05)  levothyroxine 112 mcg (0.112 mg) oral tablet: 1 tab(s) orally once a day (25 Oct 2017 09:05)  Neurontin 300 mg oral capsule: 1 cap(s) orally 3 times a day (25 Oct 2017 09:05)    MEDICATIONS  (STANDING):  ALBUTerol/ipratropium for Nebulization 3 milliLiter(s) Nebulizer every 6 hours  docusate sodium 100 milliGRAM(s) Oral three times a day  gabapentin 300 milliGRAM(s) Oral three times a day  heparin  Injectable 5000 Unit(s) SubCutaneous every 8 hours  levothyroxine 112 MICROGram(s) Oral daily  liothyronine 5 MICROGram(s) Oral every 3 days  propranolol  milliGRAM(s) Oral daily  senna 2 Tablet(s) Oral at bedtime    MEDICATIONS  (PRN):  acetaminophen   Tablet. 650 milliGRAM(s) Oral every 6 hours PRN Mild Pain (1 - 3)  acetaminophen 325 mG/butalbital 50 mG/caffeine 40 mG 1 Tablet(s) Oral every 6 hours PRN headache  naloxone Injectable 0.1 milliGRAM(s) IV Push every 3 minutes PRN For ANY of the following changes in patient status:  A. RR LESS THAN 10 breaths per minute, B. Oxygen saturation LESS THAN 90%, C. Sedation score of 6  ondansetron Injectable 4 milliGRAM(s) IV Push every 6 hours PRN Nausea  oxyCODONE    IR 5 milliGRAM(s) Oral every 4 hours PRN Moderate Pain (4 - 6)  oxyCODONE    IR 10 milliGRAM(s) Oral every 6 hours PRN Severe Pain (7 - 10)  prochlorperazine   Injectable 10 milliGRAM(s) IV Push every 6 hours PRN nausea      Allergies    morphine (Other)  Topamax (Other)    Intolerances        Vital Signs Last 24 Hrs  T(C): 36.6 (31 Oct 2017 08:33), Max: 36.6 (31 Oct 2017 08:33)  T(F): 97.8 (31 Oct 2017 08:33), Max: 97.8 (31 Oct 2017 08:33)  HR: 90 (31 Oct 2017 06:00) (69 - 93)  BP: 130/53 (31 Oct 2017 06:00) (110/56 - 130/53)  BP(mean): 72 (31 Oct 2017 06:00) (63 - 73)  RR: --  SpO2: 96% (31 Oct 2017 06:00) (90% - 100%)      PHYSICAL EXAMINATION:    NECK:  Supple. No lymphadenopathy. Jugular venous pressure not elevated. Carotids equal.   HEART:   The cardiac impulse has a normal quality. Reg., Nl S1 and S2.  There are no murmurs, rubs or gallops noted  CHEST:  Chest is clear to auscultation. Normal respiratory effort.  ABDOMEN:  Soft and nontender.   EXTREMITIES:  There is no edema.       LABS:
Subjective:    pat better, no new complaint, waiting CT surgery to remove chest tube.    Home Medications:  Advil 200 mg oral tablet: 2 tab(s) orally every 6 hours (25 Oct 2017 09:05)  Cytomel: 10 microgram(s) orally 2 times a week (25 Oct 2017 09:05)  Daily Multi oral tablet: 1 tab(s) orally once a day (25 Oct 2017 09:05)  Flexeril 10 mg oral tablet: 1 tab(s) orally 3 times a day, As Needed (25 Oct 2017 09:05)  Inderal  mg oral capsule, extended release: 1 cap(s) orally once a day (at bedtime) (25 Oct 2017 09:05)  levothyroxine 112 mcg (0.112 mg) oral tablet: 1 tab(s) orally once a day (25 Oct 2017 09:05)  Neurontin 300 mg oral capsule: 1 cap(s) orally 3 times a day (25 Oct 2017 09:05)    MEDICATIONS  (STANDING):  ALBUTerol/ipratropium for Nebulization 3 milliLiter(s) Nebulizer every 6 hours  docusate sodium 100 milliGRAM(s) Oral three times a day  gabapentin 300 milliGRAM(s) Oral three times a day  heparin  Injectable 5000 Unit(s) SubCutaneous every 8 hours  levothyroxine 112 MICROGram(s) Oral daily  liothyronine 5 MICROGram(s) Oral every 3 days  propranolol  milliGRAM(s) Oral daily  senna 2 Tablet(s) Oral at bedtime    MEDICATIONS  (PRN):  acetaminophen   Tablet. 650 milliGRAM(s) Oral every 6 hours PRN Mild Pain (1 - 3)  acetaminophen 325 mG/butalbital 50 mG/caffeine 40 mG 1 Tablet(s) Oral every 6 hours PRN headache  naloxone Injectable 0.1 milliGRAM(s) IV Push every 3 minutes PRN For ANY of the following changes in patient status:  A. RR LESS THAN 10 breaths per minute, B. Oxygen saturation LESS THAN 90%, C. Sedation score of 6  ondansetron Injectable 4 milliGRAM(s) IV Push every 6 hours PRN Nausea  oxyCODONE    IR 5 milliGRAM(s) Oral every 4 hours PRN Moderate Pain (4 - 6)  prochlorperazine   Injectable 10 milliGRAM(s) IV Push every 6 hours PRN nausea      Allergies    morphine (Other)  Topamax (Other)    Intolerances        Vital Signs Last 24 Hrs  T(C): 36.6 (01 Nov 2017 09:00), Max: 36.6 (01 Nov 2017 09:00)  T(F): 97.9 (01 Nov 2017 09:00), Max: 97.9 (01 Nov 2017 09:00)  HR: 74 (01 Nov 2017 09:00) (70 - 86)  BP: 104/40 (01 Nov 2017 09:00) (104/40 - 138/60)  BP(mean): 55 (01 Nov 2017 09:00) (55 - 78)  RR: --  SpO2: 98% (01 Nov 2017 07:00) (95% - 99%)      PHYSICAL EXAMINATION:    NECK:  Supple. No lymphadenopathy. Jugular venous pressure not elevated. Carotids equal.   HEART:   The cardiac impulse has a normal quality. Reg., Nl S1 and S2.  There are no murmurs, rubs or gallops noted  CHEST:  Chest is clear to auscultation. Normal respiratory effort.  ABDOMEN:  Soft and nontender.   EXTREMITIES:  There is no edema.       LABS:
Subjective:    pat no new complaint, Chest tube air leak, no respiratory distress.    Home Medications:  Advil 200 mg oral tablet: 2 tab(s) orally every 6 hours (25 Oct 2017 09:05)  Cytomel: 10 microgram(s) orally 2 times a week (25 Oct 2017 09:05)  Daily Multi oral tablet: 1 tab(s) orally once a day (25 Oct 2017 09:05)  Flexeril 10 mg oral tablet: 1 tab(s) orally 3 times a day, As Needed (25 Oct 2017 09:05)  Inderal  mg oral capsule, extended release: 1 cap(s) orally once a day (at bedtime) (25 Oct 2017 09:05)  levothyroxine 112 mcg (0.112 mg) oral tablet: 1 tab(s) orally once a day (25 Oct 2017 09:05)  Neurontin 300 mg oral capsule: 1 cap(s) orally 3 times a day (25 Oct 2017 09:05)    MEDICATIONS  (STANDING):  ALBUTerol/ipratropium for Nebulization 3 milliLiter(s) Nebulizer every 6 hours  gabapentin 300 milliGRAM(s) Oral three times a day  heparin  Injectable 5000 Unit(s) SubCutaneous every 8 hours  ibuprofen  Tablet 400 milliGRAM(s) Oral every 6 hours  levothyroxine 112 MICROGram(s) Oral daily  liothyronine 5 MICROGram(s) Oral every 3 days  propranolol  milliGRAM(s) Oral daily    MEDICATIONS  (PRN):  acetaminophen   Tablet. 650 milliGRAM(s) Oral every 6 hours PRN Mild Pain (1 - 3)  naloxone Injectable 0.1 milliGRAM(s) IV Push every 3 minutes PRN For ANY of the following changes in patient status:  A. RR LESS THAN 10 breaths per minute, B. Oxygen saturation LESS THAN 90%, C. Sedation score of 6  ondansetron Injectable 4 milliGRAM(s) IV Push every 6 hours PRN Nausea  oxyCODONE    IR 5 milliGRAM(s) Oral every 4 hours PRN Moderate Pain (4 - 6)  oxyCODONE    IR 10 milliGRAM(s) Oral every 6 hours PRN Severe Pain (7 - 10)  prochlorperazine   Injectable 10 milliGRAM(s) IV Push every 6 hours PRN nausea      Allergies    morphine (Other)  Topamax (Other)    Intolerances        Vital Signs Last 24 Hrs  T(C): 36.7 (27 Oct 2017 06:23), Max: 36.7 (27 Oct 2017 06:23)  T(F): 98 (27 Oct 2017 06:23), Max: 98 (27 Oct 2017 06:23)  HR: 82 (27 Oct 2017 06:00) (76 - 102)  BP: 120/46 (27 Oct 2017 04:00) (113/57 - 128/54)  BP(mean): 64 (27 Oct 2017 04:00) (58 - 76)  RR: 15 (27 Oct 2017 06:00) (10 - 26)  SpO2: 100% (27 Oct 2017 06:00) (86% - 100%)      PHYSICAL EXAMINATION:    NECK:  Supple. No lymphadenopathy. Jugular venous pressure not elevated. Carotids equal.   HEART:   The cardiac impulse has a normal quality. Reg., Nl S1 and S2.  There are no murmurs, rubs or gallops noted  CHEST:  Chest is clear to auscultation. Normal respiratory effort.  ABDOMEN:  Soft and nontender.   EXTREMITIES:  There is no edema.       LABS:                        11.7   11.2  )-----------( 219      ( 27 Oct 2017 05:54 )             33.9     10-26    138  |  104  |  10  ----------------------------<  132<H>  4.8   |  28  |  0.67    Ca    8.8      26 Oct 2017 05:43
Subjective:    pat no new complaint, Chest tube minimal air leak. Still s/q emphysema.    Home Medications:  Advil 200 mg oral tablet: 2 tab(s) orally every 6 hours (25 Oct 2017 09:05)  Cytomel: 10 microgram(s) orally 2 times a week (25 Oct 2017 09:05)  Daily Multi oral tablet: 1 tab(s) orally once a day (25 Oct 2017 09:05)  Flexeril 10 mg oral tablet: 1 tab(s) orally 3 times a day, As Needed (25 Oct 2017 09:05)  Inderal  mg oral capsule, extended release: 1 cap(s) orally once a day (at bedtime) (25 Oct 2017 09:05)  levothyroxine 112 mcg (0.112 mg) oral tablet: 1 tab(s) orally once a day (25 Oct 2017 09:05)  Neurontin 300 mg oral capsule: 1 cap(s) orally 3 times a day (25 Oct 2017 09:05)    MEDICATIONS  (STANDING):  ALBUTerol/ipratropium for Nebulization 3 milliLiter(s) Nebulizer every 6 hours  docusate sodium 100 milliGRAM(s) Oral three times a day  gabapentin 300 milliGRAM(s) Oral three times a day  heparin  Injectable 5000 Unit(s) SubCutaneous every 8 hours  ketorolac   Injectable 15 milliGRAM(s) IV Push every 6 hours  levothyroxine 112 MICROGram(s) Oral daily  liothyronine 5 MICROGram(s) Oral every 3 days  propranolol  milliGRAM(s) Oral daily  senna 2 Tablet(s) Oral at bedtime    MEDICATIONS  (PRN):  acetaminophen   Tablet. 650 milliGRAM(s) Oral every 6 hours PRN Mild Pain (1 - 3)  acetaminophen 325 mG/butalbital 50 mG/caffeine 40 mG 1 Tablet(s) Oral every 6 hours PRN headache  naloxone Injectable 0.1 milliGRAM(s) IV Push every 3 minutes PRN For ANY of the following changes in patient status:  A. RR LESS THAN 10 breaths per minute, B. Oxygen saturation LESS THAN 90%, C. Sedation score of 6  ondansetron Injectable 4 milliGRAM(s) IV Push every 6 hours PRN Nausea  oxyCODONE    IR 5 milliGRAM(s) Oral every 4 hours PRN Moderate Pain (4 - 6)  oxyCODONE    IR 10 milliGRAM(s) Oral every 6 hours PRN Severe Pain (7 - 10)  prochlorperazine   Injectable 10 milliGRAM(s) IV Push every 6 hours PRN nausea      Allergies    morphine (Other)  Topamax (Other)    Intolerances        Vital Signs Last 24 Hrs  T(C): 36.3 (29 Oct 2017 08:41), Max: 37.3 (28 Oct 2017 14:24)  T(F): 97.4 (29 Oct 2017 08:41), Max: 99.1 (28 Oct 2017 14:24)  HR: 78 (29 Oct 2017 12:00) (67 - 89)  BP: 131/60 (29 Oct 2017 12:00) (123/53 - 131/66)  BP(mean): 74 (29 Oct 2017 12:00) (69 - 78)  RR: 22 (29 Oct 2017 12:00) (10 - 25)  SpO2: 98% (29 Oct 2017 12:00) (86% - 100%)      PHYSICAL EXAMINATION:    NECK:  Supple. No lymphadenopathy. Jugular venous pressure not elevated. Carotids equal.   HEART:   The cardiac impulse has a normal quality. Reg., Nl S1 and S2.  There are no murmurs, rubs or gallops noted  CHEST:  Chest is clear to auscultation. Normal respiratory effort.  ABDOMEN:  Soft and nontender.   EXTREMITIES:  There is no edema.       LABS:                        12.4   8.2   )-----------( 254      ( 28 Oct 2017 06:09 )             35.0     10-28    135  |  102  |  7   ----------------------------<  96  3.5   |  26  |  0.58    Ca    8.8      28 Oct 2017 06:09
Subjective:    pat s/p removal of chest tube, no new complaint.    Home Medications:  acetaminophen 325 mg oral tablet: 2 tab(s) orally every 6 hours, As needed, Mild Pain (1 - 3) (02 Nov 2017 07:40)  Advil 200 mg oral tablet: 2 tab(s) orally every 6 hours (25 Oct 2017 09:05)  Cytomel: 10 microgram(s) orally 2 times a week (25 Oct 2017 09:05)  Daily Multi oral tablet: 1 tab(s) orally once a day (25 Oct 2017 09:05)  docusate sodium 100 mg oral capsule: 1 cap(s) orally 3 times a day (02 Nov 2017 07:40)  Flexeril 10 mg oral tablet: 1 tab(s) orally 3 times a day, As Needed (25 Oct 2017 09:05)  Inderal  mg oral capsule, extended release: 1 cap(s) orally once a day (at bedtime) (25 Oct 2017 09:05)  levothyroxine 112 mcg (0.112 mg) oral tablet: 1 tab(s) orally once a day (25 Oct 2017 09:05)  Neurontin 300 mg oral capsule: 1 cap(s) orally 3 times a day (25 Oct 2017 09:05)  senna oral tablet: 2 tab(s) orally once a day (at bedtime) (02 Nov 2017 07:40)    MEDICATIONS  (STANDING):  ALBUTerol/ipratropium for Nebulization 3 milliLiter(s) Nebulizer every 6 hours  docusate sodium 100 milliGRAM(s) Oral three times a day  gabapentin 300 milliGRAM(s) Oral three times a day  heparin  Injectable 5000 Unit(s) SubCutaneous every 8 hours  levothyroxine 112 MICROGram(s) Oral daily  liothyronine 5 MICROGram(s) Oral every 3 days  propranolol  milliGRAM(s) Oral daily  senna 2 Tablet(s) Oral at bedtime    MEDICATIONS  (PRN):  acetaminophen   Tablet. 650 milliGRAM(s) Oral every 6 hours PRN Mild Pain (1 - 3)  acetaminophen 325 mG/butalbital 50 mG/caffeine 40 mG 1 Tablet(s) Oral every 6 hours PRN headache  naloxone Injectable 0.1 milliGRAM(s) IV Push every 3 minutes PRN For ANY of the following changes in patient status:  A. RR LESS THAN 10 breaths per minute, B. Oxygen saturation LESS THAN 90%, C. Sedation score of 6  ondansetron Injectable 4 milliGRAM(s) IV Push every 6 hours PRN Nausea  oxyCODONE    IR 5 milliGRAM(s) Oral every 4 hours PRN Moderate Pain (4 - 6)  prochlorperazine   Injectable 10 milliGRAM(s) IV Push every 6 hours PRN nausea      Allergies    morphine (Other)  Topamax (Other)    Intolerances        Vital Signs Last 24 Hrs  T(C): 36.2 (02 Nov 2017 09:36), Max: 36.3 (01 Nov 2017 16:41)  T(F): 97.2 (02 Nov 2017 09:36), Max: 97.3 (01 Nov 2017 16:41)  HR: 96 (02 Nov 2017 07:00) (69 - 96)  BP: 121/56 (02 Nov 2017 07:00) (89/39 - 129/55)  BP(mean): 72 (02 Nov 2017 07:00) (51 - 77)  RR: 18 (01 Nov 2017 15:00) (18 - 19)  SpO2: 96% (02 Nov 2017 07:00) (82% - 100%)      PHYSICAL EXAMINATION:    NECK:  Supple. No lymphadenopathy. Jugular venous pressure not elevated. Carotids equal.   HEART:   The cardiac impulse has a normal quality. Reg., Nl S1 and S2.  There are no murmurs, rubs or gallops noted  CHEST:  Chest is clear to auscultation. Normal respiratory effort.  ABDOMEN:  Soft and nontender.   EXTREMITIES:  There is no edema.       LABS:
Subjective:    pat sitting in bed, still chest tube air leak, s/q emphysema, no respiratory distress.    Home Medications:  Advil 200 mg oral tablet: 2 tab(s) orally every 6 hours (25 Oct 2017 09:05)  Cytomel: 10 microgram(s) orally 2 times a week (25 Oct 2017 09:05)  Daily Multi oral tablet: 1 tab(s) orally once a day (25 Oct 2017 09:05)  Flexeril 10 mg oral tablet: 1 tab(s) orally 3 times a day, As Needed (25 Oct 2017 09:05)  Inderal  mg oral capsule, extended release: 1 cap(s) orally once a day (at bedtime) (25 Oct 2017 09:05)  levothyroxine 112 mcg (0.112 mg) oral tablet: 1 tab(s) orally once a day (25 Oct 2017 09:05)  Neurontin 300 mg oral capsule: 1 cap(s) orally 3 times a day (25 Oct 2017 09:05)    MEDICATIONS  (STANDING):  ALBUTerol/ipratropium for Nebulization 3 milliLiter(s) Nebulizer every 6 hours  docusate sodium 100 milliGRAM(s) Oral three times a day  gabapentin 300 milliGRAM(s) Oral three times a day  heparin  Injectable 5000 Unit(s) SubCutaneous every 8 hours  ketorolac   Injectable 15 milliGRAM(s) IV Push every 6 hours  levothyroxine 112 MICROGram(s) Oral daily  liothyronine 5 MICROGram(s) Oral every 3 days  propranolol  milliGRAM(s) Oral daily  senna 2 Tablet(s) Oral at bedtime    MEDICATIONS  (PRN):  acetaminophen   Tablet. 650 milliGRAM(s) Oral every 6 hours PRN Mild Pain (1 - 3)  naloxone Injectable 0.1 milliGRAM(s) IV Push every 3 minutes PRN For ANY of the following changes in patient status:  A. RR LESS THAN 10 breaths per minute, B. Oxygen saturation LESS THAN 90%, C. Sedation score of 6  ondansetron Injectable 4 milliGRAM(s) IV Push every 6 hours PRN Nausea  oxyCODONE    IR 5 milliGRAM(s) Oral every 4 hours PRN Moderate Pain (4 - 6)  oxyCODONE    IR 10 milliGRAM(s) Oral every 6 hours PRN Severe Pain (7 - 10)  prochlorperazine   Injectable 10 milliGRAM(s) IV Push every 6 hours PRN nausea      Allergies    morphine (Other)  Topamax (Other)    Intolerances        Vital Signs Last 24 Hrs  T(C): 36.9 (28 Oct 2017 09:01), Max: 36.9 (28 Oct 2017 09:01)  T(F): 98.4 (28 Oct 2017 09:01), Max: 98.4 (28 Oct 2017 09:01)  HR: 86 (28 Oct 2017 13:01) (71 - 100)  BP: 134/67 (28 Oct 2017 06:00) (120/71 - 134/67)  BP(mean): 84 (28 Oct 2017 06:00) (78 - 84)  RR: 25 (28 Oct 2017 13:01) (10 - 25)  SpO2: 100% (28 Oct 2017 13:01) (95% - 100%)      PHYSICAL EXAMINATION:    NECK:  Supple. No lymphadenopathy. Jugular venous pressure not elevated. Carotids equal.   HEART:   The cardiac impulse has a normal quality. Reg., Nl S1 and S2.  There are no murmurs, rubs or gallops noted  CHEST:  Chest s/q emphysema auscultation. Normal respiratory effort.  ABDOMEN:  Soft and nontender.   EXTREMITIES:  There is no edema.       LABS:                        12.4   8.2   )-----------( 254      ( 28 Oct 2017 06:09 )             35.0     10-28    135  |  102  |  7   ----------------------------<  96  3.5   |  26  |  0.58    Ca    8.8      28 Oct 2017 06:09    Chest 1 View AP/PA. (10.28.17 @ 08:54) >  Impression:unchanged RIGHT thoracostomy tube. Subcutaneous emphysema   again noted in the RIGHT neck and RIGHT lateral soft tissues.
Subjective:    pat still air leak, no new complaint.    Home Medications:  Advil 200 mg oral tablet: 2 tab(s) orally every 6 hours (25 Oct 2017 09:05)  Cytomel: 10 microgram(s) orally 2 times a week (25 Oct 2017 09:05)  Daily Multi oral tablet: 1 tab(s) orally once a day (25 Oct 2017 09:05)  Flexeril 10 mg oral tablet: 1 tab(s) orally 3 times a day, As Needed (25 Oct 2017 09:05)  Inderal  mg oral capsule, extended release: 1 cap(s) orally once a day (at bedtime) (25 Oct 2017 09:05)  levothyroxine 112 mcg (0.112 mg) oral tablet: 1 tab(s) orally once a day (25 Oct 2017 09:05)  Neurontin 300 mg oral capsule: 1 cap(s) orally 3 times a day (25 Oct 2017 09:05)    MEDICATIONS  (STANDING):  ALBUTerol/ipratropium for Nebulization 3 milliLiter(s) Nebulizer every 6 hours  docusate sodium 100 milliGRAM(s) Oral three times a day  gabapentin 300 milliGRAM(s) Oral three times a day  heparin  Injectable 5000 Unit(s) SubCutaneous every 8 hours  levothyroxine 112 MICROGram(s) Oral daily  liothyronine 5 MICROGram(s) Oral every 3 days  propranolol  milliGRAM(s) Oral daily  senna 2 Tablet(s) Oral at bedtime    MEDICATIONS  (PRN):  acetaminophen   Tablet. 650 milliGRAM(s) Oral every 6 hours PRN Mild Pain (1 - 3)  acetaminophen 325 mG/butalbital 50 mG/caffeine 40 mG 1 Tablet(s) Oral every 6 hours PRN headache  naloxone Injectable 0.1 milliGRAM(s) IV Push every 3 minutes PRN For ANY of the following changes in patient status:  A. RR LESS THAN 10 breaths per minute, B. Oxygen saturation LESS THAN 90%, C. Sedation score of 6  ondansetron Injectable 4 milliGRAM(s) IV Push every 6 hours PRN Nausea  oxyCODONE    IR 5 milliGRAM(s) Oral every 4 hours PRN Moderate Pain (4 - 6)  oxyCODONE    IR 10 milliGRAM(s) Oral every 6 hours PRN Severe Pain (7 - 10)  prochlorperazine   Injectable 10 milliGRAM(s) IV Push every 6 hours PRN nausea      Allergies    morphine (Other)  Topamax (Other)    Intolerances        Vital Signs Last 24 Hrs  T(C): 36.4 (30 Oct 2017 06:00), Max: 37.2 (29 Oct 2017 17:57)  T(F): 97.6 (30 Oct 2017 06:00), Max: 99 (29 Oct 2017 17:57)  HR: 85 (30 Oct 2017 06:00) (78 - 95)  BP: 132/54 (30 Oct 2017 06:00) (116/45 - 136/57)  BP(mean): 72 (30 Oct 2017 06:00) (63 - 77)  RR: 16 (30 Oct 2017 05:00) (12 - 22)  SpO2: 93% (30 Oct 2017 06:00) (92% - 100%)      PHYSICAL EXAMINATION:    NECK:  Supple. No lymphadenopathy. Jugular venous pressure not elevated. Carotids equal.   HEART:   The cardiac impulse has a normal quality. Reg., Nl S1 and S2.  There are no murmurs, rubs or gallops noted  CHEST:  Chest is clear to auscultation. Normal respiratory effort.  ABDOMEN:  Soft and nontender.   EXTREMITIES:  There is no edema.       LABS:      Chest 1 View AP/PA. (10.30.17 @ 07:46) >  FINDINGS/  IMPRESSION:      Right-sided chest tube is in stable position. No pneumothorax. Extensive   subcutaneous emphysema within the right neck and right chest wall and   right arm is again identified. No focal consolidation or pleural effusion
Subjective:  Pt is a 63 y/o female who is a current smoker, COPD, hypothyroidism, HTN, fibromyalgia, lumbar disc disease who was noted to have a 3cm Rt lung nodule, PET positive lesion. Pt underwent a Robotic  assisted RVATS, RML/RLL wedge resection on 10/25/17.     10/26  Pt was nauseous overnight, complains of migraines, she also required x1 straight cath for failed TOV after OR. Currently pt continues to complain of headache, nausea has improved and admits to mild pain at CT site.    10/27 No acute events overnight, states headache improved yesterday on current regiment. Denies chest pain, fever, chills, nausea, vomiting, and headache.     10/28 No acute events, CXR improved subQ air, small air leak    10/29 No acute events, chest tube to water seal with air leak and moderate subQ air, placed back on Suction    10/30 No acute events, patient feeling increasing pressure around neck when chest tube is off suction. Chest Xray today on suction is improved, less subQ air, however today during water seal trial patient had noticeable increase in subQ air around chest and neck, chest tube placed back on suction.     Tele:                                                             T(C): 36.4 (10-30-17 @ 06:00), Max: 37.2 (10-29-17 @ 17:57)  HR: 77 (10-30-17 @ 10:00) (76 - 95)  BP: 116/53 (10-30-17 @ 10:00) (116/45 - 136/57)  RR: 16 (10-30-17 @ 05:00) (12 - 22)  SpO2: 97% (10-30-17 @ 10:00) (92% - 100%)      CXR:  < from: Xray Chest 1 View AP/PA. (10.30.17 @ 07:46) >  FINDINGS/  IMPRESSION:      Right-sided chest tube is in stable position. No pneumothorax. Extensive   subcutaneous emphysema within the right neck and right chest wall and   right arm is again identified. No focal consolidation or pleural effusion   within the lung parenchyma.    < end of copied text >    I&O's Detail    29 Oct 2017 07:01  -  30 Oct 2017 07:00  --------------------------------------------------------  IN:  Total IN: 0 mL    OUT:    Chest Tube: 75 mL    Voided: 650 mL  Total OUT: 725 mL    Total NET: -725 mL    CHEST TUBE:  [x ] YES [ ] NO  OUTPUT:   75cc  per 24 hours    AIR LEAK:  [ x] YES [ ] NO      Drug Dosing Weight  Height (cm): 168.91 (25 Oct 2017 08:44)  Weight (kg): 61.2 (25 Oct 2017 08:44)  BMI (kg/m2): 21.5 (25 Oct 2017 08:44)  BSA (m2): 1.7 (25 Oct 2017 08:44)    ALBUTerol/ipratropium for Nebulization 3 milliLiter(s) Nebulizer every 6 hours  docusate sodium 100 milliGRAM(s) Oral three times a day  levothyroxine 112 MICROGram(s) Oral daily  liothyronine 5 MICROGram(s) Oral every 3 days  propranolol  milliGRAM(s) Oral daily  senna 2 Tablet(s) Oral at bedtime    Assessment  Neuro: awake and alert  Pulm: noticeable crepitus around right chest, otherwise CTA   CV: S1, S2  Abd: Soft, non-tender, +BS  Extrem/MS: no edema    Assessment:  62yFemale with PAST MEDICAL & SURGICAL HISTORY:  Mitral valve prolapse  Tinnitus  Hearing loss: right ear with tinnitus  Trigeminal neuralgia  Neck swelling: b/l swelling superior to clavicles  Lumbar disc herniation  Emphysema of lung  Cervical disc disease  Back pain  Neck pain  Skin cancer: right thigh  Cigarette smoker  Lung mass: right side  Palpitation  Thyroid disease  Migraine  S/P endoscopy  S/P colonoscopy  S/P tonsillectomy: 1965  H/O left breast biopsy: 2000. 2001  H/O Moh's micrographic surgery for skin cancer: right thigh 2015
Pt c/o Rt chest tube pain, uneventful night.    Vital Signs Last 24 Hrs  T(C): 36.4 (28 Oct 2017 06:31), Max: 37.2 (27 Oct 2017 12:05)  T(F): 97.6 (28 Oct 2017 06:31), Max: 99 (27 Oct 2017 12:05)  HR: 79 (28 Oct 2017 10:01) (71 - 100)  BP: 134/67 (28 Oct 2017 06:00) (120/71 - 138/71)  BP(mean): 84 (28 Oct 2017 06:00) (78 - 84)  RR: 12 (28 Oct 2017 10:01) (10 - 18)  SpO2: 96% (28 Oct 2017 10:01) (95% - 100%)    Daily     Daily     I&O's Detail    27 Oct 2017 07:01  -  28 Oct 2017 07:00  --------------------------------------------------------  IN:  Total IN: 0 mL    OUT:    Chest Tube: 160 mL    Voided: 2400 mL  Total OUT: 2560 mL    Total NET: -2560 mL          CAPILLARY BLOOD GLUCOSE                                          12.4   8.2   )-----------( 254      ( 28 Oct 2017 06:09 )             35.0       10-28    135  |  102  |  7   ----------------------------<  96  3.5   |  26  |  0.58    Ca    8.8      28 Oct 2017 06:09                        MEDICATIONS  (STANDING):  ALBUTerol/ipratropium for Nebulization 3 milliLiter(s) Nebulizer every 6 hours  docusate sodium 100 milliGRAM(s) Oral three times a day  gabapentin 300 milliGRAM(s) Oral three times a day  heparin  Injectable 5000 Unit(s) SubCutaneous every 8 hours  ketorolac   Injectable 15 milliGRAM(s) IV Push every 6 hours  levothyroxine 112 MICROGram(s) Oral daily  liothyronine 5 MICROGram(s) Oral every 3 days  propranolol  milliGRAM(s) Oral daily  senna 2 Tablet(s) Oral at bedtime    MEDICATIONS  (PRN):  acetaminophen   Tablet. 650 milliGRAM(s) Oral every 6 hours PRN Mild Pain (1 - 3)  naloxone Injectable 0.1 milliGRAM(s) IV Push every 3 minutes PRN For ANY of the following changes in patient status:  A. RR LESS THAN 10 breaths per minute, B. Oxygen saturation LESS THAN 90%, C. Sedation score of 6  ondansetron Injectable 4 milliGRAM(s) IV Push every 6 hours PRN Nausea  oxyCODONE    IR 5 milliGRAM(s) Oral every 4 hours PRN Moderate Pain (4 - 6)  oxyCODONE    IR 10 milliGRAM(s) Oral every 6 hours PRN Severe Pain (7 - 10)  prochlorperazine   Injectable 10 milliGRAM(s) IV Push every 6 hours PRN nausea
Pt with uneventful night, no new complaints, wants to go home today.    Vital Signs Last 24 Hrs  T(C): 36.6 (31 Oct 2017 08:33), Max: 36.6 (31 Oct 2017 08:33)  T(F): 97.8 (31 Oct 2017 08:33), Max: 97.8 (31 Oct 2017 08:33)  HR: 90 (31 Oct 2017 06:00) (69 - 93)  BP: 130/53 (31 Oct 2017 06:00) (110/56 - 130/53)  BP(mean): 72 (31 Oct 2017 06:00) (63 - 73)  RR: --  SpO2: 96% (31 Oct 2017 06:00) (90% - 100%)    Daily     Daily Weight in k (31 Oct 2017 06:26)    I&O's Detail    30 Oct 2017 07:01  -  31 Oct 2017 07:00  --------------------------------------------------------  IN:  Total IN: 0 mL    OUT:    Chest Tube: 90 mL    Voided: 1100 mL  Total OUT: 1190 mL    Total NET: -1190 mL          CAPILLARY BLOOD GLUCOSE                                                MEDICATIONS  (STANDING):  ALBUTerol/ipratropium for Nebulization 3 milliLiter(s) Nebulizer every 6 hours  docusate sodium 100 milliGRAM(s) Oral three times a day  gabapentin 300 milliGRAM(s) Oral three times a day  heparin  Injectable 5000 Unit(s) SubCutaneous every 8 hours  levothyroxine 112 MICROGram(s) Oral daily  liothyronine 5 MICROGram(s) Oral every 3 days  propranolol  milliGRAM(s) Oral daily  senna 2 Tablet(s) Oral at bedtime    MEDICATIONS  (PRN):  acetaminophen   Tablet. 650 milliGRAM(s) Oral every 6 hours PRN Mild Pain (1 - 3)  acetaminophen 325 mG/butalbital 50 mG/caffeine 40 mG 1 Tablet(s) Oral every 6 hours PRN headache  naloxone Injectable 0.1 milliGRAM(s) IV Push every 3 minutes PRN For ANY of the following changes in patient status:  A. RR LESS THAN 10 breaths per minute, B. Oxygen saturation LESS THAN 90%, C. Sedation score of 6  ondansetron Injectable 4 milliGRAM(s) IV Push every 6 hours PRN Nausea  oxyCODONE    IR 5 milliGRAM(s) Oral every 4 hours PRN Moderate Pain (4 - 6)  oxyCODONE    IR 10 milliGRAM(s) Oral every 6 hours PRN Severe Pain (7 - 10)  prochlorperazine   Injectable 10 milliGRAM(s) IV Push every 6 hours PRN nausea
Pt with uneventful night, s/p removal of her CP, offers no new complaints.  She wants to go home today.    Vital Signs Last 24 Hrs  T(C): 36.1 (2017 05:22), Max: 36.3 (2017 16:41)  T(F): 97 (2017 05:22), Max: 97.3 (2017 16:41)  HR: 96 (2017 07:00) (69 - 96)  BP: 121/56 (2017 07:00) (89/39 - 129/55)  BP(mean): 72 (2017 07:00) (51 - 77)  RR: 18 (2017 15:00) (18 - 19)  SpO2: 96% (2017 07:00) (82% - 100%)    Daily     Daily Weight in k.1 (2017 05:22)    I&O's Detail    2017 07:01  -  2017 07:00  --------------------------------------------------------  IN:  Total IN: 0 mL    OUT:    Chest Tube: 20 mL  Total OUT: 20 mL    Total NET: -20 mL          CAPILLARY BLOOD GLUCOSE                                                MEDICATIONS  (STANDING):  ALBUTerol/ipratropium for Nebulization 3 milliLiter(s) Nebulizer every 6 hours  docusate sodium 100 milliGRAM(s) Oral three times a day  gabapentin 300 milliGRAM(s) Oral three times a day  heparin  Injectable 5000 Unit(s) SubCutaneous every 8 hours  levothyroxine 112 MICROGram(s) Oral daily  liothyronine 5 MICROGram(s) Oral every 3 days  propranolol  milliGRAM(s) Oral daily  senna 2 Tablet(s) Oral at bedtime    MEDICATIONS  (PRN):  acetaminophen   Tablet. 650 milliGRAM(s) Oral every 6 hours PRN Mild Pain (1 - 3)  acetaminophen 325 mG/butalbital 50 mG/caffeine 40 mG 1 Tablet(s) Oral every 6 hours PRN headache  naloxone Injectable 0.1 milliGRAM(s) IV Push every 3 minutes PRN For ANY of the following changes in patient status:  A. RR LESS THAN 10 breaths per minute, B. Oxygen saturation LESS THAN 90%, C. Sedation score of 6  ondansetron Injectable 4 milliGRAM(s) IV Push every 6 hours PRN Nausea  oxyCODONE    IR 5 milliGRAM(s) Oral every 4 hours PRN Moderate Pain (4 - 6)  prochlorperazine   Injectable 10 milliGRAM(s) IV Push every 6 hours PRN nausea

## 2017-11-02 NOTE — PROGRESS NOTE ADULT - NECK DETAILS
supple/no JVD
no JVD/supple
supple/no JVD
supple/no JVD
no JVD/supple
supple/no JVD

## 2017-11-02 NOTE — DISCHARGE NOTE ADULT - CARE PROVIDER_API CALL
Mervin Lopez), Thoracic Surgery  [address] [address]  Phone: [phone]  Fax: [phone]    Wes Gomez), Critical Care Medicine; Internal Medicine; Pulmonary Disease; Sleep Medicine  [address] [place] [address]  Phone: [phone]  Fax: [phone]    Armando Arias), Internal Medicine  [address] [facility] [address], [place] [address]  Phone: [phone]  Fax: [phone]

## 2017-11-06 DIAGNOSIS — J43.9 EMPHYSEMA, UNSPECIFIED: ICD-10-CM

## 2017-11-06 DIAGNOSIS — G50.0 TRIGEMINAL NEURALGIA: ICD-10-CM

## 2017-11-06 DIAGNOSIS — M51.36 OTHER INTERVERTEBRAL DISC DEGENERATION, LUMBAR REGION: ICD-10-CM

## 2017-11-06 DIAGNOSIS — E03.9 HYPOTHYROIDISM, UNSPECIFIED: ICD-10-CM

## 2017-11-06 DIAGNOSIS — I34.1 NONRHEUMATIC MITRAL (VALVE) PROLAPSE: ICD-10-CM

## 2017-11-06 DIAGNOSIS — I10 ESSENTIAL (PRIMARY) HYPERTENSION: ICD-10-CM

## 2017-11-06 DIAGNOSIS — Z85.828 PERSONAL HISTORY OF OTHER MALIGNANT NEOPLASM OF SKIN: ICD-10-CM

## 2017-11-06 DIAGNOSIS — R91.1 SOLITARY PULMONARY NODULE: ICD-10-CM

## 2017-11-06 DIAGNOSIS — G43.909 MIGRAINE, UNSPECIFIED, NOT INTRACTABLE, WITHOUT STATUS MIGRAINOSUS: ICD-10-CM

## 2017-11-06 DIAGNOSIS — M79.7 FIBROMYALGIA: ICD-10-CM

## 2017-11-06 DIAGNOSIS — F17.200 NICOTINE DEPENDENCE, UNSPECIFIED, UNCOMPLICATED: ICD-10-CM

## 2017-11-06 DIAGNOSIS — D72.829 ELEVATED WHITE BLOOD CELL COUNT, UNSPECIFIED: ICD-10-CM

## 2017-11-06 DIAGNOSIS — R51 HEADACHE: ICD-10-CM

## 2017-11-06 DIAGNOSIS — J95.812 POSTPROCEDURAL AIR LEAK: ICD-10-CM

## 2017-11-09 ENCOUNTER — APPOINTMENT (OUTPATIENT)
Dept: THORACIC SURGERY | Facility: CLINIC | Age: 62
End: 2017-11-09
Payer: COMMERCIAL

## 2017-11-09 VITALS
HEIGHT: 66.5 IN | OXYGEN SATURATION: 96 % | BODY MASS INDEX: 21.6 KG/M2 | DIASTOLIC BLOOD PRESSURE: 68 MMHG | HEART RATE: 83 BPM | WEIGHT: 136 LBS | SYSTOLIC BLOOD PRESSURE: 126 MMHG | RESPIRATION RATE: 16 BRPM

## 2017-11-09 PROCEDURE — 99024 POSTOP FOLLOW-UP VISIT: CPT

## 2017-11-09 RX ORDER — LEVOTHYROXINE SODIUM 0.1 MG/1
100 TABLET ORAL
Qty: 90 | Refills: 0 | Status: ACTIVE | COMMUNITY
Start: 2017-06-20

## 2017-11-09 RX ORDER — GABAPENTIN 100 MG/1
100 CAPSULE ORAL
Qty: 270 | Refills: 0 | Status: ACTIVE | COMMUNITY
Start: 2017-02-22

## 2017-11-09 RX ORDER — FLUCONAZOLE 150 MG/1
150 TABLET ORAL
Qty: 1 | Refills: 0 | Status: COMPLETED | COMMUNITY
Start: 2017-06-13

## 2017-11-09 RX ORDER — LIOTHYRONINE SODIUM 5 UG/1
5 TABLET ORAL
Qty: 180 | Refills: 0 | Status: ACTIVE | COMMUNITY
Start: 2017-05-12

## 2017-11-09 RX ORDER — BUTALBITAL, ACETAMINOPHEN AND CAFFEINE 325; 50; 40 MG/1; MG/1; MG/1
50-325-40 TABLET ORAL
Qty: 30 | Refills: 0 | Status: ACTIVE | COMMUNITY
Start: 2017-06-13

## 2017-11-09 RX ORDER — OXYCODONE AND ACETAMINOPHEN 5; 325 MG/1; MG/1
5-325 TABLET ORAL
Qty: 40 | Refills: 0 | Status: ACTIVE | COMMUNITY
Start: 2017-11-09 | End: 1900-01-01

## 2017-11-09 RX ORDER — OXYCODONE AND ACETAMINOPHEN 5; 325 MG/1; MG/1
5-325 TABLET ORAL
Qty: 40 | Refills: 0 | Status: COMPLETED | COMMUNITY
Start: 2017-11-09

## 2017-11-09 RX ORDER — CIPROFLOXACIN HYDROCHLORIDE 500 MG/1
500 TABLET, FILM COATED ORAL
Qty: 20 | Refills: 0 | Status: COMPLETED | COMMUNITY
Start: 2017-06-14

## 2017-11-09 RX ORDER — PROPRANOLOL HYDROCHLORIDE 120 MG/1
120 CAPSULE, EXTENDED RELEASE ORAL
Qty: 90 | Refills: 0 | Status: ACTIVE | COMMUNITY
Start: 2017-01-30

## 2017-11-09 RX ORDER — NYSTATIN 100000 [USP'U]/ML
100000 SUSPENSION ORAL
Qty: 200 | Refills: 0 | Status: COMPLETED | COMMUNITY
Start: 2017-05-23

## 2017-11-09 RX ORDER — AMOXICILLIN AND CLAVULANATE POTASSIUM 875; 125 MG/1; MG/1
875-125 TABLET, COATED ORAL
Qty: 20 | Refills: 0 | Status: COMPLETED | COMMUNITY
Start: 2017-05-31

## 2017-11-25 LAB
CULTURE RESULTS: SIGNIFICANT CHANGE UP
SPECIMEN SOURCE: SIGNIFICANT CHANGE UP

## 2017-12-07 ENCOUNTER — APPOINTMENT (OUTPATIENT)
Dept: THORACIC SURGERY | Facility: CLINIC | Age: 62
End: 2017-12-07
Payer: COMMERCIAL

## 2017-12-07 VITALS
SYSTOLIC BLOOD PRESSURE: 116 MMHG | RESPIRATION RATE: 16 BRPM | DIASTOLIC BLOOD PRESSURE: 74 MMHG | HEIGHT: 66 IN | WEIGHT: 136 LBS | OXYGEN SATURATION: 97 % | HEART RATE: 89 BPM | BODY MASS INDEX: 21.86 KG/M2

## 2017-12-07 PROCEDURE — 99024 POSTOP FOLLOW-UP VISIT: CPT

## 2017-12-07 RX ORDER — TRAMADOL HYDROCHLORIDE 50 MG/1
50 TABLET, COATED ORAL
Qty: 50 | Refills: 0 | Status: ACTIVE | COMMUNITY
Start: 2017-12-07 | End: 1900-01-01

## 2017-12-16 LAB
CULTURE RESULTS: SIGNIFICANT CHANGE UP
SPECIMEN SOURCE: SIGNIFICANT CHANGE UP

## 2017-12-28 ENCOUNTER — APPOINTMENT (OUTPATIENT)
Dept: THORACIC SURGERY | Facility: CLINIC | Age: 62
End: 2017-12-28
Payer: COMMERCIAL

## 2017-12-28 VITALS
HEART RATE: 89 BPM | OXYGEN SATURATION: 100 % | SYSTOLIC BLOOD PRESSURE: 130 MMHG | DIASTOLIC BLOOD PRESSURE: 75 MMHG | BODY MASS INDEX: 21.86 KG/M2 | WEIGHT: 136 LBS | RESPIRATION RATE: 16 BRPM | HEIGHT: 66 IN

## 2017-12-28 PROCEDURE — 99213 OFFICE O/P EST LOW 20 MIN: CPT | Mod: 24

## 2018-04-26 ENCOUNTER — APPOINTMENT (OUTPATIENT)
Dept: THORACIC SURGERY | Facility: CLINIC | Age: 63
End: 2018-04-26
Payer: COMMERCIAL

## 2018-04-26 VITALS
WEIGHT: 135 LBS | OXYGEN SATURATION: 96 % | HEIGHT: 66 IN | HEART RATE: 84 BPM | DIASTOLIC BLOOD PRESSURE: 78 MMHG | SYSTOLIC BLOOD PRESSURE: 123 MMHG | BODY MASS INDEX: 21.69 KG/M2 | RESPIRATION RATE: 16 BRPM

## 2018-04-26 PROCEDURE — 99213 OFFICE O/P EST LOW 20 MIN: CPT

## 2019-05-21 NOTE — H&P PST ADULT - BACK
[facility]  Pulmonary, Critical Care, and Sleep Medicine      Pulmonary Office Progress Notes    Name: Dex Jacobo     : 1946     Date: 2019        Subjective:     Patient is a 67 y.o. female is here for follow up for: Problems-follow up chronic bronchitis and hypoxic respiratory failure. 19    Feels better overall. Does get very SOB with activity. Denies much cough except in the mornings when she has to cough and clear the mucus. She noticed an increase in cough after she was exposed to someone who had a bad cough. No fever, chills  Denies nausea. Using inhalers and SOB at baseline. On Oxygen at 2 L. Per daughter she has less need to use O2 at rest with SaO2 > 94%  She is on retroviral therapy tolerating well. States that her counts are doing good. HPI:  Patient is a 70 y.o. female hx of HIV since  with last cd4 213  recent discharge for pneumonia , readmitted on  with persistent cough and sob. Feels better overall- still has antibiotics. Using supplemental Oxygen, incentive spirometer. Less productive cough. No associated wheezing fever or chills. No n/v/d. No chest pain  No headache. Appetite improving. Past Medical History:   Diagnosis Date    Congest[facility] heart failure ([facility]     Coronary atherosclerosis of native coronary artery     Essential hypertension     HIV (human immunodeficiency virus infection) ([facility]     Ill-defined condition     Pneumonia    Nonspecific abnormal electrocardiogram (ECG) (EKG)     Pre-operative cardiovascular examination        No Known Allergies    Current Outpatient Medications   Medication Sig Dispense Refill    beclomethasone (QVAR) 80 mcg/actuation aero Take 1 Puff by inhalation two (2) times a day.       dilTIAZem CD (CARDIZEM CD) 300 mg ER capsule TAKE 1 CAPSULE BY MOUTH DAILY 90 Cap 1    SPIRIVA WITH HANDIHALER 18 mcg inhalation capsule INHALE CONTENTS OF 1 CAPSULE ONCE DAILY USING HANDIHALER 30 Cap 0    abacavir/dolutegravir/lamivudi (TRIUMEQ PO) Take  by mouth.  acetylcysteine (MUCOMYST) 100 mg/mL (10 %) nebulizer solution Take 4 mL by inhalation three (3) times daily. 40 mL 1    acetaminophen (TYLENOL) 500 mg tablet Take 500 mg by mouth daily as needed for Pain.  mirtazapine (REMERON) 30 mg tablet TK 1 T PO HS  5    OXYGEN-AIR DELIVERY SYSTEMS 2 L/min by Nasal route continuous.  albuterol (PROVENTIL VENTOLIN) 2.5 mg /3 mL (0.083 %) nebulizer solution 3 mL by Nebulization route every four (4) hours as needed for Wheezing. 24 Each 2    aspirin 81 mg chewable tablet Take 1 Tab by mouth daily. 30 Tab 0    sertraline (ZOLOFT) 25 mg tablet Take  by mouth daily.  COMBIGAN 0.2-0.5 % drop ophthalmic solution INT 1 GTT IN OU BID  1    fexofenadine (ALLEGRA) 180 mg tablet Take 180 mg by mouth daily.  fluticasone (FLONASE) 50 mcg/actuation nasal spray 2 Sprays by Both Nostrils route daily.  darunavir-cobicistat (PREZCOBIX) 800-150 mg-mg per tablet Take 1 Tab by mouth daily.        Review of Systems:  HEENT: No epistaxis, no nasal drainage, no difficulty in swallowing, no redness in eyes  Respiratory: as above  Cardiovascular: no chest pain, no palpitations, no chronic leg edema, no syncope  Gastrointestinal: no abd pain, no vomiting, no diarrhea, no bleeding symptoms  Genitourinary: No urinary symptoms or hematuria  Integument/breast: No ulcers or rashes  Musculoskeletal:Neg  Neurological: No focal weakness, no seizures, no headaches  Behvioral/Psych: No anxiety, no depression  Constitutional: No fever, no chills, no weight loss, no night sweats     Objective:     Visit Vitals  /76 (BP 1 Location: Left arm, BP Patient Position: Sitting)   Pulse 92   Temp 97.2 °F (36.2 °C) (Oral)   Resp 18   Ht 5' 3\" (1.6 m)   Wt 73 kg (161 lb)   SpO2 97% Comment: RA Rest   BMI 28.52 kg/m²        Physical Exam:   General: comfortable, no acute distress  HEENT: pupils reactive, sclera anicteric, EOM intact  Neck: No adenopathy or thyroid swelling, no JVD, supple  CVS: S1S2 no murmurs  RS: Mod AE bilaterally, no tactile fremitus or egophony, no accessory muscle use  Abd: soft, non tender, no hepatosplenomegaly  Neuro: non focal, awake, alert  Extrm: no leg edema, clubbing or cyanosis  Skin: no rash    Data review:     No visits with results within 6 Month(s) from this visit. Latest known visit with results is:   Admission on 05/27/2018, Discharged on 06/01/2018   No results displayed because visit has over 200 results. 10/03/18  Patient effort:   Good  Meets ATS criteria for interpretation    Flows:  Maximal Mid Expiratory Flow rate is reduced to 29 % predicted  Forced Expiratory Volume in one second is reduced to 53 % predicted  FEV 1% is reduced  Volumes:  High Normal Volumes  Flow Volume Loop:  Nonspecific obstructive pattern in Flow Volume Loop  Bronchodilator:  No significant improvement with bronchodilator therapy  Diffusion:  Abnormal Diffusion Capacity reduced to 38 % predicted    Impression:  Moderate obstructive defect, mild Hyperinflation, Reduced diffusion capacity indicating a decrease in alveolar surface area for gas exchange  Date FVC FEV1  FEV1/FVC [id number] TLC RV RV/TLC VC DLCO   10/3/18  53%  29%     38%                 Imaging:  I have personally reviewed the patients radiographs and have reviewed the reports:  CT scan chest 6/15/2018 at :    1.  No CT evidence of pulmonary thromboembolic disease. 2.  Severe emphysema. 3.  Moderate posterior lower lobe atelectasis. 4.  Slight pericardial effusion. 5.  Left humeral head AVN. Followup recommendations: No specific radiological followup recommended. CXR:  5/31/18  The cardiac and mediastinal silhouette are within normal limits. Pulmonary vasculature is within normal limits. Right lower lobe basilar consolidation. No pleural effusion or pneumothorax. Mild lingular atelectasis.  Degenerative changes in the shoulders. IMPRESSION:  Basilar right lower lobe pneumonia, recommend follow-up after treatment to ensure resolution. 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 Hypoxia due to mucus plugging and emphysema. Significantly improved baseline oxygenation. PFT with FEV1-53% predicted and DLCO 38% predicted  · CT- no evidence of ILD to suggest PCP. Slow improvement. Recent imaging with improvement both on CXR and CT scan. Now further clinical improveemnt  · Chest pain- atypical likely esophagitis. ? Candida, GERD. · Acute CHF, systolic and possibly diastolic  · Pulmonary HTN- cardiac and Pulmonary etiology  · Hx of htn  · Hx of anxiety d/o        RECOMMENDATIONS:     · Will continue Spiriva and albuterol for home use  · Instructed to use nebulized albuterol on a scheduled basis for the next couple of days to help with mucus clearing and the current exacerbation  · Continue HAART- per ID  · Will continue to Assess Home O2 needs- at rest and ambulation, encouraged to use  · PFT and 6 min walk - results discussed  · Will benefit from referral to Pulmonary rehab  · Preventive vaccinations.   · Will follow for complex care management- next appointment in 3 months  · Discussed with grand niece and patient        Yinka Olguin MD No deformity or limitation of movement

## 2022-12-06 ENCOUNTER — NON-APPOINTMENT (OUTPATIENT)
Age: 67
End: 2022-12-06

## 2022-12-06 DIAGNOSIS — J44.9 CHRONIC OBSTRUCTIVE PULMONARY DISEASE, UNSPECIFIED: ICD-10-CM

## 2022-12-06 DIAGNOSIS — Z92.3 PERSONAL HISTORY OF IRRADIATION: ICD-10-CM

## 2022-12-06 PROBLEM — J43.9 EMPHYSEMA, UNSPECIFIED: Chronic | Status: ACTIVE | Noted: 2017-10-18

## 2022-12-06 PROBLEM — H93.19 TINNITUS, UNSPECIFIED EAR: Chronic | Status: ACTIVE | Noted: 2017-10-18

## 2022-12-06 PROBLEM — E07.9 DISORDER OF THYROID, UNSPECIFIED: Chronic | Status: ACTIVE | Noted: 2017-10-18

## 2022-12-06 PROBLEM — M54.9 DORSALGIA, UNSPECIFIED: Chronic | Status: ACTIVE | Noted: 2017-10-18

## 2022-12-06 PROBLEM — R00.2 PALPITATIONS: Chronic | Status: ACTIVE | Noted: 2017-10-18

## 2022-12-06 PROBLEM — R91.8 OTHER NONSPECIFIC ABNORMAL FINDING OF LUNG FIELD: Chronic | Status: ACTIVE | Noted: 2017-10-18

## 2022-12-06 PROBLEM — H91.90 UNSPECIFIED HEARING LOSS, UNSPECIFIED EAR: Chronic | Status: ACTIVE | Noted: 2017-10-18

## 2022-12-06 PROBLEM — I34.1 NONRHEUMATIC MITRAL (VALVE) PROLAPSE: Chronic | Status: ACTIVE | Noted: 2017-10-25

## 2022-12-06 PROBLEM — F17.210 NICOTINE DEPENDENCE, CIGARETTES, UNCOMPLICATED: Chronic | Status: ACTIVE | Noted: 2017-10-18

## 2022-12-06 PROBLEM — G43.909 MIGRAINE, UNSPECIFIED, NOT INTRACTABLE, WITHOUT STATUS MIGRAINOSUS: Chronic | Status: ACTIVE | Noted: 2017-10-18

## 2022-12-06 PROBLEM — G50.0 TRIGEMINAL NEURALGIA: Chronic | Status: ACTIVE | Noted: 2017-10-18

## 2022-12-06 PROBLEM — C44.90 UNSPECIFIED MALIGNANT NEOPLASM OF SKIN, UNSPECIFIED: Chronic | Status: ACTIVE | Noted: 2017-10-18

## 2022-12-06 PROBLEM — R22.1 LOCALIZED SWELLING, MASS AND LUMP, NECK: Chronic | Status: ACTIVE | Noted: 2017-10-18

## 2022-12-06 PROBLEM — M50.90 CERVICAL DISC DISORDER, UNSPECIFIED, UNSPECIFIED CERVICAL REGION: Chronic | Status: ACTIVE | Noted: 2017-10-18

## 2022-12-06 PROBLEM — M54.2 CERVICALGIA: Chronic | Status: ACTIVE | Noted: 2017-10-18

## 2022-12-06 PROBLEM — M51.26 OTHER INTERVERTEBRAL DISC DISPLACEMENT, LUMBAR REGION: Chronic | Status: ACTIVE | Noted: 2017-10-18

## 2023-01-25 ENCOUNTER — TRANSCRIPTION ENCOUNTER (OUTPATIENT)
Age: 68
End: 2023-01-25

## 2023-02-07 ENCOUNTER — APPOINTMENT (OUTPATIENT)
Dept: ENDOCRINOLOGY | Facility: CLINIC | Age: 68
End: 2023-02-07
Payer: MEDICARE

## 2023-02-07 VITALS
HEIGHT: 66 IN | DIASTOLIC BLOOD PRESSURE: 64 MMHG | SYSTOLIC BLOOD PRESSURE: 124 MMHG | TEMPERATURE: 97.6 F | WEIGHT: 127 LBS | BODY MASS INDEX: 20.41 KG/M2 | OXYGEN SATURATION: 95 % | HEART RATE: 79 BPM

## 2023-02-07 DIAGNOSIS — M79.7 FIBROMYALGIA: ICD-10-CM

## 2023-02-07 DIAGNOSIS — E03.9 HYPOTHYROIDISM, UNSPECIFIED: ICD-10-CM

## 2023-02-07 DIAGNOSIS — I10 ESSENTIAL (PRIMARY) HYPERTENSION: ICD-10-CM

## 2023-02-07 DIAGNOSIS — G43.719 CHRONIC MIGRAINE W/OUT AURA, INTRACTABLE, W/OUT STATUS MIGRAINOSUS: ICD-10-CM

## 2023-02-07 PROCEDURE — 99214 OFFICE O/P EST MOD 30 MIN: CPT

## 2023-02-07 NOTE — REVIEW OF SYSTEMS
[Fatigue] : fatigue [Decreased Appetite] : decreased appetite [Eye Pain] : pain [Shortness Of Breath] : shortness of breath [Cough] : cough [Wheezing] : wheezing [Joint Pain] : joint pain [Muscle Weakness] : muscle weakness [Myalgia] : myalgia  [Headaches] : headaches [Dizziness] : dizziness [Poor Balance] : poor balance [Insomnia] : insomnia [Anxiety] : anxiety [Negative] : Heme/Lymph

## 2023-02-07 NOTE — HISTORY OF PRESENT ILLNESS
[FreeTextEntry1] : This is a pleasant 67-year-old white female who has a longstanding history of hypothyroidism secondary to radioactive iodine therapy.  Patient recently had moved to [place] and she comes in for routine follow-up and evaluation.  She claims that her thyroid medication has been adjusted multiple times because her TSH levels were fluctuating.  She is currently taking Cytomel 5 mg tablets 2 tablets 3 times a week and Synthroid 100 mcg tablets daily.  Has been complaining of intermittent headaches loss of energy and appetite.  Patient was diagnosed to have Vyepti with temporary relief.  She denies any blurring vision chest pains shortness of breath nausea vomiting.  Her appetite is good and there is no recent weight changes.  Review of systems is otherwise negative.  She has a history of fibromyalgia hypertension, COPD patient is all in there all, Neurontin, gabapentin,

## 2024-08-12 NOTE — ASU PREOP CHECKLIST - ORDERS/MEDICATION ADMINISTRATION RECORD ON CHART
MEDICATION REFILL REQUEST      Name of Medication:  Potassium Chloride  Dose:  10 MEQ  Frequency:  QD  Quantity:  90  Days' supply:  90 with refills      Pharmacy Name/Location:  [phone]  
Requested Prescriptions     Pending Prescriptions Disp Refills    furosemide (LASIX) 20 MG tablet 90 tablet 3     Sig: Take 1 tablet by mouth daily TAKE ONE TABLET BY MOUTH ONE TIME DAILY    potassium chloride (KLOR-CON) 10 MEQ extended release tablet 90 tablet 3     Sig: Take 1 tablet by mouth daily     Verified rx. Last OV 05/01/24. Erx to pharm on file.    
no preop medications ordered/done

## 2024-10-10 NOTE — PROGRESS NOTE ADULT - PROBLEM SELECTOR PROBLEM 1
Lung mass
POST-OP DIAGNOSIS:  Cancer of left breast 10-Oct-2024 16:05:14  Nikky Dietz  
POST-OP DIAGNOSIS:  Cancer of left breast 10-Oct-2024 16:05:14  Nikky Dietz  Macromastia 10-Oct-2024 17:37:49  Ally Ray

## 2025-06-14 NOTE — CONSULT NOTE ADULT - RESPIRATORY
detailed exam